# Patient Record
Sex: FEMALE | Race: WHITE | NOT HISPANIC OR LATINO
[De-identification: names, ages, dates, MRNs, and addresses within clinical notes are randomized per-mention and may not be internally consistent; named-entity substitution may affect disease eponyms.]

---

## 2018-01-29 ENCOUNTER — APPOINTMENT (OUTPATIENT)
Dept: MAMMOGRAPHY | Facility: CLINIC | Age: 48
End: 2018-01-29
Payer: COMMERCIAL

## 2018-01-29 ENCOUNTER — APPOINTMENT (OUTPATIENT)
Dept: ULTRASOUND IMAGING | Facility: CLINIC | Age: 48
End: 2018-01-29
Payer: COMMERCIAL

## 2018-01-29 ENCOUNTER — OUTPATIENT (OUTPATIENT)
Dept: OUTPATIENT SERVICES | Facility: HOSPITAL | Age: 48
LOS: 1 days | End: 2018-01-29
Payer: COMMERCIAL

## 2018-01-29 DIAGNOSIS — Z00.8 ENCOUNTER FOR OTHER GENERAL EXAMINATION: ICD-10-CM

## 2018-01-29 PROCEDURE — 77063 BREAST TOMOSYNTHESIS BI: CPT

## 2018-01-29 PROCEDURE — 77063 BREAST TOMOSYNTHESIS BI: CPT | Mod: 26

## 2018-01-29 PROCEDURE — 76641 ULTRASOUND BREAST COMPLETE: CPT | Mod: 26,50

## 2018-01-29 PROCEDURE — 77067 SCR MAMMO BI INCL CAD: CPT | Mod: 26

## 2018-01-29 PROCEDURE — 76641 ULTRASOUND BREAST COMPLETE: CPT

## 2018-01-29 PROCEDURE — 77067 SCR MAMMO BI INCL CAD: CPT

## 2018-02-26 ENCOUNTER — APPOINTMENT (OUTPATIENT)
Dept: INTERNAL MEDICINE | Facility: CLINIC | Age: 48
End: 2018-02-26

## 2018-03-08 ENCOUNTER — APPOINTMENT (OUTPATIENT)
Dept: INTERNAL MEDICINE | Facility: CLINIC | Age: 48
End: 2018-03-08
Payer: COMMERCIAL

## 2018-03-08 VITALS
HEIGHT: 61.75 IN | DIASTOLIC BLOOD PRESSURE: 78 MMHG | WEIGHT: 212 LBS | BODY MASS INDEX: 39.01 KG/M2 | OXYGEN SATURATION: 98 % | HEART RATE: 90 BPM | SYSTOLIC BLOOD PRESSURE: 130 MMHG

## 2018-03-08 PROCEDURE — 99386 PREV VISIT NEW AGE 40-64: CPT | Mod: 25

## 2018-03-08 PROCEDURE — 69210 REMOVE IMPACTED EAR WAX UNI: CPT

## 2018-03-16 LAB
25(OH)D3 SERPL-MCNC: 8.9 NG/ML
ALBUMIN SERPL ELPH-MCNC: 4.5 G/DL
ALP BLD-CCNC: 71 U/L
ALT SERPL-CCNC: 16 U/L
ANION GAP SERPL CALC-SCNC: 17 MMOL/L
AST SERPL-CCNC: 16 U/L
BASOPHILS # BLD AUTO: 0.04 K/UL
BASOPHILS NFR BLD AUTO: 0.4 %
BILIRUB SERPL-MCNC: 0.4 MG/DL
BUN SERPL-MCNC: 15 MG/DL
CALCIUM SERPL-MCNC: 9.8 MG/DL
CHLORIDE SERPL-SCNC: 103 MMOL/L
CHOLEST SERPL-MCNC: 175 MG/DL
CHOLEST/HDLC SERPL: 3.6 RATIO
CO2 SERPL-SCNC: 20 MMOL/L
CREAT SERPL-MCNC: 1 MG/DL
EOSINOPHIL # BLD AUTO: 0.15 K/UL
EOSINOPHIL NFR BLD AUTO: 1.6 %
GLUCOSE SERPL-MCNC: 84 MG/DL
HBA1C MFR BLD HPLC: 5.5 %
HCT VFR BLD CALC: 45.9 %
HDLC SERPL-MCNC: 49 MG/DL
HGB BLD-MCNC: 14.5 G/DL
IMM GRANULOCYTES NFR BLD AUTO: 0.4 %
LDLC SERPL CALC-MCNC: 99 MG/DL
LYMPHOCYTES # BLD AUTO: 2.04 K/UL
LYMPHOCYTES NFR BLD AUTO: 21.5 %
MAN DIFF?: NORMAL
MCHC RBC-ENTMCNC: 30.3 PG
MCHC RBC-ENTMCNC: 31.6 GM/DL
MCV RBC AUTO: 96 FL
MONOCYTES # BLD AUTO: 0.48 K/UL
MONOCYTES NFR BLD AUTO: 5.1 %
NEUTROPHILS # BLD AUTO: 6.73 K/UL
NEUTROPHILS NFR BLD AUTO: 71 %
PLATELET # BLD AUTO: 428 K/UL
POTASSIUM SERPL-SCNC: 4.7 MMOL/L
PROT SERPL-MCNC: 8.1 G/DL
RBC # BLD: 4.78 M/UL
RBC # FLD: 12.4 %
SODIUM SERPL-SCNC: 140 MMOL/L
TRIGL SERPL-MCNC: 134 MG/DL
WBC # FLD AUTO: 9.48 K/UL

## 2018-03-21 ENCOUNTER — FORM ENCOUNTER (OUTPATIENT)
Age: 48
End: 2018-03-21

## 2018-03-22 ENCOUNTER — OUTPATIENT (OUTPATIENT)
Dept: OUTPATIENT SERVICES | Facility: HOSPITAL | Age: 48
LOS: 1 days | End: 2018-03-22
Payer: COMMERCIAL

## 2018-03-22 ENCOUNTER — APPOINTMENT (OUTPATIENT)
Dept: ULTRASOUND IMAGING | Facility: CLINIC | Age: 48
End: 2018-03-22
Payer: COMMERCIAL

## 2018-03-22 DIAGNOSIS — Z00.8 ENCOUNTER FOR OTHER GENERAL EXAMINATION: ICD-10-CM

## 2018-03-22 PROCEDURE — 76856 US EXAM PELVIC COMPLETE: CPT

## 2018-03-22 PROCEDURE — 76830 TRANSVAGINAL US NON-OB: CPT

## 2018-03-22 PROCEDURE — 76830 TRANSVAGINAL US NON-OB: CPT | Mod: 26

## 2018-03-22 PROCEDURE — 76856 US EXAM PELVIC COMPLETE: CPT | Mod: 26

## 2019-01-28 ENCOUNTER — RESULT REVIEW (OUTPATIENT)
Age: 49
End: 2019-01-28

## 2019-01-30 ENCOUNTER — APPOINTMENT (OUTPATIENT)
Dept: ULTRASOUND IMAGING | Facility: CLINIC | Age: 49
End: 2019-01-30
Payer: COMMERCIAL

## 2019-01-30 ENCOUNTER — OUTPATIENT (OUTPATIENT)
Dept: OUTPATIENT SERVICES | Facility: HOSPITAL | Age: 49
LOS: 1 days | End: 2019-01-30
Payer: COMMERCIAL

## 2019-01-30 ENCOUNTER — APPOINTMENT (OUTPATIENT)
Dept: MAMMOGRAPHY | Facility: CLINIC | Age: 49
End: 2019-01-30
Payer: COMMERCIAL

## 2019-01-30 DIAGNOSIS — Z12.31 ENCOUNTER FOR SCREENING MAMMOGRAM FOR MALIGNANT NEOPLASM OF BREAST: ICD-10-CM

## 2019-01-30 PROCEDURE — 77067 SCR MAMMO BI INCL CAD: CPT | Mod: 26

## 2019-01-30 PROCEDURE — 77063 BREAST TOMOSYNTHESIS BI: CPT | Mod: 26

## 2019-01-30 PROCEDURE — 76641 ULTRASOUND BREAST COMPLETE: CPT | Mod: 26,50

## 2019-01-30 PROCEDURE — 77067 SCR MAMMO BI INCL CAD: CPT

## 2019-01-30 PROCEDURE — 77063 BREAST TOMOSYNTHESIS BI: CPT

## 2019-01-30 PROCEDURE — 76641 ULTRASOUND BREAST COMPLETE: CPT

## 2019-03-11 ENCOUNTER — APPOINTMENT (OUTPATIENT)
Dept: INTERNAL MEDICINE | Facility: CLINIC | Age: 49
End: 2019-03-11
Payer: COMMERCIAL

## 2019-03-11 VITALS
WEIGHT: 208 LBS | OXYGEN SATURATION: 97 % | SYSTOLIC BLOOD PRESSURE: 120 MMHG | DIASTOLIC BLOOD PRESSURE: 82 MMHG | HEART RATE: 96 BPM | BODY MASS INDEX: 38.77 KG/M2 | HEIGHT: 61.5 IN

## 2019-03-11 DIAGNOSIS — Z86.69 PERSONAL HISTORY OF OTHER DISEASES OF THE NERVOUS SYSTEM AND SENSE ORGANS: ICD-10-CM

## 2019-03-11 DIAGNOSIS — L65.9 NONSCARRING HAIR LOSS, UNSPECIFIED: ICD-10-CM

## 2019-03-11 DIAGNOSIS — N94.9 UNSPECIFIED CONDITION ASSOCIATED WITH FEMALE GENITAL ORGANS AND MENSTRUAL CYCLE: ICD-10-CM

## 2019-03-11 PROCEDURE — 99396 PREV VISIT EST AGE 40-64: CPT

## 2019-03-11 RX ORDER — ERGOCALCIFEROL 1.25 MG/1
1.25 MG CAPSULE, LIQUID FILLED ORAL
Qty: 8 | Refills: 0 | Status: DISCONTINUED | COMMUNITY
Start: 2018-03-16 | End: 2019-03-11

## 2019-03-11 NOTE — PAST MEDICAL HISTORY
[Menstruating] : hx of menstruating [Less Bleeding] : the period was lighter than normal [Irregular Cycle Intervals] : are  irregular [Total Preg ___] : G: [unfilled] [Full Term ___] : Full Term: [unfilled]

## 2019-03-29 LAB
25(OH)D3 SERPL-MCNC: 14 NG/ML
ALBUMIN SERPL ELPH-MCNC: 4.3 G/DL
ALP BLD-CCNC: 71 U/L
ALT SERPL-CCNC: 27 U/L
ANION GAP SERPL CALC-SCNC: 12 MMOL/L
AST SERPL-CCNC: 30 U/L
BASOPHILS # BLD AUTO: 0.08 K/UL
BASOPHILS NFR BLD AUTO: 0.7 %
BILIRUB SERPL-MCNC: 0.3 MG/DL
BUN SERPL-MCNC: 10 MG/DL
CALCIUM SERPL-MCNC: 9.5 MG/DL
CHLORIDE SERPL-SCNC: 102 MMOL/L
CHOLEST SERPL-MCNC: 158 MG/DL
CHOLEST/HDLC SERPL: 3.1 RATIO
CO2 SERPL-SCNC: 25 MMOL/L
CREAT SERPL-MCNC: 0.87 MG/DL
CRP SERPL-MCNC: 1.7 MG/DL
EOSINOPHIL # BLD AUTO: 0.18 K/UL
EOSINOPHIL NFR BLD AUTO: 1.7 %
ERYTHROCYTE [SEDIMENTATION RATE] IN BLOOD BY WESTERGREN METHOD: 9 MM/HR
FERRITIN SERPL-MCNC: 190 NG/ML
GLUCOSE SERPL-MCNC: 84 MG/DL
HCT VFR BLD CALC: 44.8 %
HDLC SERPL-MCNC: 51 MG/DL
HGB BLD-MCNC: 14.1 G/DL
IMM GRANULOCYTES NFR BLD AUTO: 0.5 %
IRON SATN MFR SERPL: 23 %
IRON SERPL-MCNC: 72 UG/DL
JAK2 GENE MUT ANL BLD/T: NORMAL
LDLC SERPL CALC-MCNC: 86 MG/DL
LYMPHOCYTES # BLD AUTO: 2.48 K/UL
LYMPHOCYTES NFR BLD AUTO: 22.9 %
MAN DIFF?: NORMAL
MCHC RBC-ENTMCNC: 30.4 PG
MCHC RBC-ENTMCNC: 31.5 GM/DL
MCV RBC AUTO: 96.6 FL
MONOCYTES # BLD AUTO: 0.68 K/UL
MONOCYTES NFR BLD AUTO: 6.3 %
NEUTROPHILS # BLD AUTO: 7.37 K/UL
NEUTROPHILS NFR BLD AUTO: 67.9 %
PLATELET # BLD AUTO: 417 K/UL
POTASSIUM SERPL-SCNC: 4.4 MMOL/L
PROT SERPL-MCNC: 7.7 G/DL
RBC # BLD: 4.64 M/UL
RBC # FLD: 12.1 %
SODIUM SERPL-SCNC: 139 MMOL/L
TIBC SERPL-MCNC: 307 UG/DL
TRIGL SERPL-MCNC: 103 MG/DL
TSH SERPL-ACNC: 2.28 UIU/ML
UIBC SERPL-MCNC: 235 UG/DL
WBC # FLD AUTO: 10.84 K/UL

## 2019-03-29 NOTE — ASSESSMENT
[FreeTextEntry1] : 49 yo female with h/o as above including rosacea, thrombocytosis of ?etiology, obesity, here for CPE.\par 1.  Heme - thrombocytosis on few labs, check CBC and check iron studies, ESR/CRP, rosetta 2\par 2.  Endo - hair loss, check iron above and TSH; check vitamin D, discussed weight loss options for pt, suggested low carb diet and c/w exercise, check lipids\par 3.  Gyn - pap and mammo utd, will get records\par 4.  HCM - check below labs; vaccines utd\par 5.  RTO prn or 1 year

## 2019-03-29 NOTE — REVIEW OF SYSTEMS
[Constipation] : constipation [Heartburn] : heartburn [Negative] : Musculoskeletal [Fever] : no fever [Chills] : no chills [Fatigue] : no fatigue [Recent Change In Weight] : ~T no recent weight change [Chest Pain] : no chest pain [Palpitations] : no palpitations [Shortness Of Breath] : no shortness of breath [Cough] : no cough [Abdominal Pain] : no abdominal pain [Nausea] : no nausea [Diarrhea] : diarrhea [Vomiting] : no vomiting [Melena] : no melena [Dysuria] : no dysuria [Vaginal Discharge] : no vaginal discharge [Skin Rash] : no skin rash [Dizziness] : no dizziness [Fainting] : no fainting [Anxiety] : no anxiety [Depression] : no depression [Easy Bleeding] : no easy bleeding [Easy Bruising] : no easy bruising [Swollen Glands] : no swollen glands [FreeTextEntry2] : going to the gym, getting walking into daily routine [FreeTextEntry7] : constipation on occasion, GERD on occasion but improved [FreeTextEntry9] : knees hurt but due to weight

## 2019-03-29 NOTE — HISTORY OF PRESENT ILLNESS
[FreeTextEntry1] : physical [de-identified] : 49 yo female with h/o as below here for CPE.\par Feeling well, no complaints.  Noting some hair loss, trying biotin but not sure if helping.

## 2019-03-29 NOTE — HEALTH RISK ASSESSMENT
[Patient reported mammogram was normal] : Patient reported mammogram was normal [Patient reported PAP Smear was normal] : Patient reported PAP Smear was normal [0] : 2) Feeling down, depressed, or hopeless: Not at all (0) [MammogramDate] : 01/19 [PapSmearDate] : 01/19 [ColonoscopyComments] : hasn't had

## 2019-03-29 NOTE — ADDENDUM
[FreeTextEntry1] : 3/22/19:  LM to call back re results.\par 3/29/19:  Reviewed labs with pt, nl except WBC 10.8 slightly elevated, platelets 417 slightly elevated with elevated CRP and ferritin (?nonspecific inflammation, mild, consider repeating in 6 months), vitamin D 14 c/w deficiency, to take vitamin D 2000 Iu/day, other labs nl.

## 2019-03-29 NOTE — PHYSICAL EXAM
[No Acute Distress] : no acute distress [Well Nourished] : well nourished [Well Developed] : well developed [Well-Appearing] : well-appearing [PERRL] : pupils equal round and reactive to light [Normal Oropharynx] : the oropharynx was normal [Normal TMs] : both tympanic membranes were normal [Normal Nasal Mucosa] : the nasal mucosa was normal [Supple] : supple [No Lymphadenopathy] : no lymphadenopathy [Thyroid Normal, No Nodules] : the thyroid was normal and there were no nodules present [No Respiratory Distress] : no respiratory distress  [Clear to Auscultation] : lungs were clear to auscultation bilaterally [No Accessory Muscle Use] : no accessory muscle use [Normal Rate] : normal rate  [Regular Rhythm] : with a regular rhythm [Normal S1, S2] : normal S1 and S2 [No Murmur] : no murmur heard [No Carotid Bruits] : no carotid bruits [Pedal Pulses Present] : the pedal pulses are present [Normal Appearance] : normal in appearance [No Nipple Discharge] : no nipple discharge [No Axillary Lymphadenopathy] : no axillary lymphadenopathy [Soft] : abdomen soft [Non Tender] : non-tender [Non-distended] : non-distended [No Masses] : no abdominal mass palpated [No HSM] : no HSM [Normal Bowel Sounds] : normal bowel sounds [Normal Supraclavicular Nodes] : no supraclavicular lymphadenopathy [Normal Axillary Nodes] : no axillary lymphadenopathy [Normal Posterior Cervical Nodes] : no posterior cervical lymphadenopathy [Normal Anterior Cervical Nodes] : no anterior cervical lymphadenopathy [Normal Inguinal Nodes] : no inguinal lymphadenopathy [No Joint Swelling] : no joint swelling [No Rash] : no rash [Normal Gait] : normal gait [Normal Affect] : the affect was normal [de-identified] : scant cerumen right auditory canal [de-identified] : trace pitting edema b/l

## 2020-02-11 ENCOUNTER — RESULT REVIEW (OUTPATIENT)
Age: 50
End: 2020-02-11

## 2020-03-06 ENCOUNTER — APPOINTMENT (OUTPATIENT)
Dept: ULTRASOUND IMAGING | Facility: CLINIC | Age: 50
End: 2020-03-06
Payer: COMMERCIAL

## 2020-03-06 ENCOUNTER — OUTPATIENT (OUTPATIENT)
Dept: OUTPATIENT SERVICES | Facility: HOSPITAL | Age: 50
LOS: 1 days | End: 2020-03-06
Payer: COMMERCIAL

## 2020-03-06 ENCOUNTER — APPOINTMENT (OUTPATIENT)
Dept: MAMMOGRAPHY | Facility: CLINIC | Age: 50
End: 2020-03-06
Payer: COMMERCIAL

## 2020-03-06 DIAGNOSIS — Z00.8 ENCOUNTER FOR OTHER GENERAL EXAMINATION: ICD-10-CM

## 2020-03-06 PROCEDURE — 76641 ULTRASOUND BREAST COMPLETE: CPT

## 2020-03-06 PROCEDURE — 77063 BREAST TOMOSYNTHESIS BI: CPT | Mod: 26

## 2020-03-06 PROCEDURE — 77063 BREAST TOMOSYNTHESIS BI: CPT

## 2020-03-06 PROCEDURE — 77067 SCR MAMMO BI INCL CAD: CPT | Mod: 26

## 2020-03-06 PROCEDURE — 76641 ULTRASOUND BREAST COMPLETE: CPT | Mod: 26,50

## 2020-03-06 PROCEDURE — 77067 SCR MAMMO BI INCL CAD: CPT

## 2020-04-25 ENCOUNTER — MESSAGE (OUTPATIENT)
Age: 50
End: 2020-04-25

## 2020-05-06 ENCOUNTER — APPOINTMENT (OUTPATIENT)
Dept: DISASTER EMERGENCY | Facility: CLINIC | Age: 50
End: 2020-05-06

## 2020-05-07 LAB
SARS-COV-2 IGG SERPL IA-ACNC: 0 INDEX
SARS-COV-2 IGG SERPL QL IA: NEGATIVE

## 2020-06-01 ENCOUNTER — APPOINTMENT (OUTPATIENT)
Dept: INTERNAL MEDICINE | Facility: CLINIC | Age: 50
End: 2020-06-01

## 2020-06-08 ENCOUNTER — APPOINTMENT (OUTPATIENT)
Dept: INTERNAL MEDICINE | Facility: CLINIC | Age: 50
End: 2020-06-08
Payer: COMMERCIAL

## 2020-06-08 ENCOUNTER — NON-APPOINTMENT (OUTPATIENT)
Age: 50
End: 2020-06-08

## 2020-06-08 VITALS
WEIGHT: 212 LBS | HEIGHT: 61.5 IN | BODY MASS INDEX: 39.51 KG/M2 | OXYGEN SATURATION: 97 % | DIASTOLIC BLOOD PRESSURE: 80 MMHG | SYSTOLIC BLOOD PRESSURE: 130 MMHG | HEART RATE: 93 BPM

## 2020-06-08 PROCEDURE — 93000 ELECTROCARDIOGRAM COMPLETE: CPT

## 2020-06-08 PROCEDURE — 99396 PREV VISIT EST AGE 40-64: CPT | Mod: 25

## 2020-06-08 RX ORDER — AZELAIC ACID 0.15 G/G
15 GEL TOPICAL
Refills: 0 | Status: DISCONTINUED | COMMUNITY
End: 2020-06-08

## 2020-06-08 NOTE — PHYSICAL EXAM
[No Acute Distress] : no acute distress [Well Nourished] : well nourished [Well Developed] : well developed [Well-Appearing] : well-appearing [PERRL] : pupils equal round and reactive to light [Normal Oropharynx] : the oropharynx was normal [Normal TMs] : both tympanic membranes were normal [No Lymphadenopathy] : no lymphadenopathy [Supple] : supple [Thyroid Normal, No Nodules] : the thyroid was normal and there were no nodules present [No Respiratory Distress] : no respiratory distress  [No Accessory Muscle Use] : no accessory muscle use [Clear to Auscultation] : lungs were clear to auscultation bilaterally [Normal Rate] : normal rate  [Regular Rhythm] : with a regular rhythm [Normal S1, S2] : normal S1 and S2 [No Murmur] : no murmur heard [No Carotid Bruits] : no carotid bruits [Pedal Pulses Present] : the pedal pulses are present [No Edema] : there was no peripheral edema [Declined Breast Exam] : declined breast exam  [Soft] : abdomen soft [Non Tender] : non-tender [Non-distended] : non-distended [No Masses] : no abdominal mass palpated [No HSM] : no HSM [Normal Bowel Sounds] : normal bowel sounds [Normal Supraclavicular Nodes] : no supraclavicular lymphadenopathy [Normal Posterior Cervical Nodes] : no posterior cervical lymphadenopathy [Normal Anterior Cervical Nodes] : no anterior cervical lymphadenopathy [Normal Inguinal Nodes] : no inguinal lymphadenopathy [No Joint Swelling] : no joint swelling [No Rash] : no rash [Normal Gait] : normal gait [Normal Affect] : the affect was normal

## 2020-06-09 ENCOUNTER — TRANSCRIPTION ENCOUNTER (OUTPATIENT)
Age: 50
End: 2020-06-09

## 2020-06-10 ENCOUNTER — APPOINTMENT (OUTPATIENT)
Dept: CARDIOLOGY | Facility: CLINIC | Age: 50
End: 2020-06-10
Payer: COMMERCIAL

## 2020-06-10 ENCOUNTER — NON-APPOINTMENT (OUTPATIENT)
Age: 50
End: 2020-06-10

## 2020-06-10 VITALS — DIASTOLIC BLOOD PRESSURE: 90 MMHG | SYSTOLIC BLOOD PRESSURE: 142 MMHG

## 2020-06-10 VITALS
OXYGEN SATURATION: 97 % | HEART RATE: 87 BPM | HEIGHT: 61.5 IN | SYSTOLIC BLOOD PRESSURE: 151 MMHG | BODY MASS INDEX: 40.07 KG/M2 | DIASTOLIC BLOOD PRESSURE: 90 MMHG | WEIGHT: 215 LBS

## 2020-06-10 DIAGNOSIS — Z78.9 OTHER SPECIFIED HEALTH STATUS: ICD-10-CM

## 2020-06-10 PROCEDURE — 93000 ELECTROCARDIOGRAM COMPLETE: CPT

## 2020-06-10 PROCEDURE — 99204 OFFICE O/P NEW MOD 45 MIN: CPT

## 2020-06-10 RX ORDER — IVERMECTIN 10 MG/G
1 CREAM TOPICAL
Refills: 0 | Status: DISCONTINUED | COMMUNITY
End: 2020-06-10

## 2020-06-10 RX ORDER — IVERMECTIN 10 MG/G
1 CREAM TOPICAL
Qty: 1 | Refills: 3 | Status: DISCONTINUED | COMMUNITY
Start: 2020-06-08 | End: 2020-06-10

## 2020-06-10 NOTE — PHYSICAL EXAM
[Well Groomed] : well groomed [General Appearance - In No Acute Distress] : no acute distress [Normal Conjunctiva] : the conjunctiva exhibited no abnormalities [Eyelids - No Xanthelasma] : the eyelids demonstrated no xanthelasmas [Normal Oral Mucosa] : normal oral mucosa [No Oral Pallor] : no oral pallor [No Oral Cyanosis] : no oral cyanosis [Normal Jugular Venous A Waves Present] : normal jugular venous A waves present [Normal Jugular Venous V Waves Present] : normal jugular venous V waves present [No Jugular Venous Burris A Waves] : no jugular venous burris A waves [Normal Rate] : normal [Rhythm Regular] : regular [Normal S1] : normal S1 [Normal S2] : normal S2 [No Gallop] : no gallop heard [No Murmur] : no murmurs heard [2+] : left 2+ [No Pitting Edema] : no pitting edema present [Respiration, Rhythm And Depth] : normal respiratory rhythm and effort [Exaggerated Use Of Accessory Muscles For Inspiration] : no accessory muscle use [Auscultation Breath Sounds / Voice Sounds] : lungs were clear to auscultation bilaterally [Abdomen Soft] : soft [Abdomen Tenderness] : non-tender [Abdomen Mass (___ Cm)] : no abdominal mass palpated [Abnormal Walk] : normal gait [Gait - Sufficient For Exercise Testing] : the gait was sufficient for exercise testing [Nail Clubbing] : no clubbing of the fingernails [Cyanosis, Localized] : no localized cyanosis [Petechial Hemorrhages (___cm)] : no petechial hemorrhages [Skin Color & Pigmentation] : normal skin color and pigmentation [] : no rash [No Venous Stasis] : no venous stasis [Skin Lesions] : no skin lesions [No Skin Ulcers] : no skin ulcer [No Xanthoma] : no  xanthoma was observed [Oriented To Time, Place, And Person] : oriented to person, place, and time [Affect] : the affect was normal [Mood] : the mood was normal [No Anxiety] : not feeling anxious [Right Carotid Bruit] : no bruit heard over the right carotid [Left Carotid Bruit] : no bruit heard over the left carotid [Bruit] : no bruit heard [Rt] : no varicose veins of the right leg [Lt] : no varicose veins of the left leg

## 2020-06-10 NOTE — HISTORY OF PRESENT ILLNESS
[FreeTextEntry1] : 49 year old woman with elevated BP in past presents for initial cardiac evaluation. \par \par She has been feeling midsternal chest pressure that started about 6 months ago, now increasing in frequency to becoming daily, nonexertional, generally nonexertional, occurring more at work, can last up to hours an episode. The pain is self limiting. \par She complains mild dyspnea on exertion when going up the stairs. \par She   denies any PND, orthopnea, lower extremity edema, near syncope, syncope, strokelike symptoms. \par She has been more sedentary recently. She states that she randomly will feel palpitations for an hours at a times every few weeks lasting about an hour. \par She has not been eating much recently and drink lots of coffee.

## 2020-06-15 ENCOUNTER — APPOINTMENT (OUTPATIENT)
Dept: CARDIOLOGY | Facility: CLINIC | Age: 50
End: 2020-06-15
Payer: COMMERCIAL

## 2020-06-15 PROCEDURE — 93015 CV STRESS TEST SUPVJ I&R: CPT

## 2020-06-19 ENCOUNTER — APPOINTMENT (OUTPATIENT)
Dept: CARDIOLOGY | Facility: CLINIC | Age: 50
End: 2020-06-19
Payer: COMMERCIAL

## 2020-06-19 PROCEDURE — 93306 TTE W/DOPPLER COMPLETE: CPT

## 2020-06-22 ENCOUNTER — TRANSCRIPTION ENCOUNTER (OUTPATIENT)
Age: 50
End: 2020-06-22

## 2020-06-24 ENCOUNTER — TRANSCRIPTION ENCOUNTER (OUTPATIENT)
Age: 50
End: 2020-06-24

## 2020-07-22 ENCOUNTER — TRANSCRIPTION ENCOUNTER (OUTPATIENT)
Age: 50
End: 2020-07-22

## 2020-07-24 LAB
25(OH)D3 SERPL-MCNC: 15.5 NG/ML
ALBUMIN SERPL ELPH-MCNC: 4.6 G/DL
ALP BLD-CCNC: 74 U/L
ALT SERPL-CCNC: 14 U/L
ANION GAP SERPL CALC-SCNC: 14 MMOL/L
AST SERPL-CCNC: 17 U/L
BASOPHILS # BLD AUTO: 0.07 K/UL
BASOPHILS NFR BLD AUTO: 0.7 %
BILIRUB SERPL-MCNC: 0.2 MG/DL
BUN SERPL-MCNC: 11 MG/DL
CALCIUM SERPL-MCNC: 9.3 MG/DL
CHLORIDE SERPL-SCNC: 102 MMOL/L
CO2 SERPL-SCNC: 21 MMOL/L
CREAT SERPL-MCNC: 0.97 MG/DL
CRP SERPL-MCNC: 0.92 MG/DL
EOSINOPHIL # BLD AUTO: 0.14 K/UL
EOSINOPHIL NFR BLD AUTO: 1.3 %
ERYTHROCYTE [SEDIMENTATION RATE] IN BLOOD BY WESTERGREN METHOD: 38 MM/HR
GLUCOSE SERPL-MCNC: 92 MG/DL
HCT VFR BLD CALC: 45.5 %
HGB BLD-MCNC: 14.7 G/DL
IMM GRANULOCYTES NFR BLD AUTO: 0.5 %
LYMPHOCYTES # BLD AUTO: 2.75 K/UL
LYMPHOCYTES NFR BLD AUTO: 25.6 %
MAN DIFF?: NORMAL
MCHC RBC-ENTMCNC: 30.4 PG
MCHC RBC-ENTMCNC: 32.3 GM/DL
MCV RBC AUTO: 94 FL
MONOCYTES # BLD AUTO: 0.74 K/UL
MONOCYTES NFR BLD AUTO: 6.9 %
NEUTROPHILS # BLD AUTO: 6.98 K/UL
NEUTROPHILS NFR BLD AUTO: 65 %
PLATELET # BLD AUTO: 399 K/UL
POTASSIUM SERPL-SCNC: 4.1 MMOL/L
PROT SERPL-MCNC: 7.7 G/DL
RBC # BLD: 4.84 M/UL
RBC # FLD: 11.9 %
SODIUM SERPL-SCNC: 137 MMOL/L
WBC # FLD AUTO: 10.73 K/UL

## 2020-07-24 NOTE — ASSESSMENT
[FreeTextEntry1] : 48 yo female with h/o as above including rosacea, obesity, mild thrombocytosis previously here for CPE.\par 1.  CV - atypical chest pain, suspect may be stress related as not triggered by exertion but does have ?qwaves in III/aVF and nonspecific T wave inversions/abnl in V2-V3, so will send to cardiology Dr. Landaverde/April for eval (may need stress/echo); recent lipids at goal, bp at goal\par 2.  Gyn - pap and mammo utd, will get report\par 3.  GI - not yet due for colonoscopy \par 4.  Heme - elevated WBC and platelets previously, recheck with inflammatory markers again\par 5.  HCM - check below labs; vaccines utd\par 6.  RTO prn or 1 year

## 2020-07-24 NOTE — HEALTH RISK ASSESSMENT
[Patient reported mammogram was normal] : Patient reported mammogram was normal [0] : 2) Feeling down, depressed, or hopeless: Not at all (0) [PapSmearDate] : 01/20 [MammogramDate] : 01/20 [ColonoscopyComments] : not yet

## 2020-07-24 NOTE — ADDENDUM
[FreeTextEntry1] : 7/24/20:  Reviewed labs with pt, nl except slightly elevated WBC 10.73 improved from prior but still elevated with mildly elevated ESR and CRP, notes has some joint pain, fatigue, had some hair loss (per history), had nl tsh before, will refer to rheum to r/o autoimmune disease, vitamin D 15 to take supplements, other labs nl.

## 2020-07-24 NOTE — REVIEW OF SYSTEMS
[Fatigue] : fatigue [Chest Pain] : chest pain [Heartburn] : heartburn [Negative] : Musculoskeletal [Fever] : no fever [Recent Change In Weight] : ~T no recent weight change [Chills] : no chills [Palpitations] : no palpitations [Dyspnea on Exertion] : no dyspnea on exertion [Shortness Of Breath] : no shortness of breath [Cough] : no cough [Abdominal Pain] : no abdominal pain [Nausea] : no nausea [Constipation] : no constipation [Diarrhea] : diarrhea [Melena] : no melena [Dysuria] : no dysuria [Vomiting] : no vomiting [Skin Rash] : no skin rash [Vaginal Discharge] : no vaginal discharge [Headache] : no headache [Dizziness] : no dizziness [Anxiety] : no anxiety [Fainting] : no fainting [Depression] : no depression [Swollen Glands] : no swollen glands [Easy Bleeding] : no easy bleeding [Easy Bruising] : no easy bruising [FreeTextEntry7] : occasional diarrhea and constipation; gerd on occasion [FreeTextEntry3] : saw optho, has fuchs dystrophy [de-identified] : occasional HA

## 2020-07-24 NOTE — HISTORY OF PRESENT ILLNESS
[FreeTextEntry1] : physical [de-identified] : 48 yo female with h/o as below here for CPE.  \par Having intermittent chest pain, no palpitations, no shortness of breath.  Not sure if it's stress-related.  Will tend to occur when stressed/overwhelmed.  Will get SOB only from the mask but no chest pressure with exertion, can go up a flight of stairs without chest pressure.  Had been walking but not much as getting home very tired.  Generally sleeping better than she was initially but on and off.  Doesn't believe needs to see a therapist, does do breathing for stress relief, venting to  helps, and crying and that will help.  \par No other active issues.  Sees dermatologist for rosacea, ran out of topical cream, asking for refill, now on soolantra.

## 2020-10-15 ENCOUNTER — APPOINTMENT (OUTPATIENT)
Dept: CARDIOLOGY | Facility: CLINIC | Age: 50
End: 2020-10-15
Payer: COMMERCIAL

## 2020-10-15 ENCOUNTER — NON-APPOINTMENT (OUTPATIENT)
Age: 50
End: 2020-10-15

## 2020-10-15 VITALS
DIASTOLIC BLOOD PRESSURE: 87 MMHG | HEART RATE: 72 BPM | BODY MASS INDEX: 38.02 KG/M2 | OXYGEN SATURATION: 97 % | HEIGHT: 61.5 IN | WEIGHT: 204 LBS | SYSTOLIC BLOOD PRESSURE: 136 MMHG

## 2020-10-15 PROCEDURE — 99214 OFFICE O/P EST MOD 30 MIN: CPT

## 2020-10-15 PROCEDURE — 93000 ELECTROCARDIOGRAM COMPLETE: CPT

## 2020-10-15 NOTE — PHYSICAL EXAM
[General Appearance - In No Acute Distress] : no acute distress [Well Groomed] : well groomed [Eyelids - No Xanthelasma] : the eyelids demonstrated no xanthelasmas [Normal Oral Mucosa] : normal oral mucosa [Normal Conjunctiva] : the conjunctiva exhibited no abnormalities [No Oral Pallor] : no oral pallor [No Oral Cyanosis] : no oral cyanosis [No Jugular Venous Burris A Waves] : no jugular venous burris A waves [Normal Jugular Venous A Waves Present] : normal jugular venous A waves present [Normal Jugular Venous V Waves Present] : normal jugular venous V waves present [Exaggerated Use Of Accessory Muscles For Inspiration] : no accessory muscle use [Respiration, Rhythm And Depth] : normal respiratory rhythm and effort [Abdomen Tenderness] : non-tender [Auscultation Breath Sounds / Voice Sounds] : lungs were clear to auscultation bilaterally [Abdomen Soft] : soft [Abdomen Mass (___ Cm)] : no abdominal mass palpated [Abnormal Walk] : normal gait [Gait - Sufficient For Exercise Testing] : the gait was sufficient for exercise testing [Nail Clubbing] : no clubbing of the fingernails [Petechial Hemorrhages (___cm)] : no petechial hemorrhages [Cyanosis, Localized] : no localized cyanosis [Skin Color & Pigmentation] : normal skin color and pigmentation [] : no rash [No Venous Stasis] : no venous stasis [Skin Lesions] : no skin lesions [No Skin Ulcers] : no skin ulcer [Affect] : the affect was normal [Oriented To Time, Place, And Person] : oriented to person, place, and time [No Xanthoma] : no  xanthoma was observed [Normal Rate] : normal [Mood] : the mood was normal [No Anxiety] : not feeling anxious [Normal S1] : normal S1 [Rhythm Regular] : regular [No Gallop] : no gallop heard [No Murmur] : no murmurs heard [Normal S2] : normal S2 [2+] : left 2+ [Bruit] : no bruit heard [Right Carotid Bruit] : no bruit heard over the right carotid [Left Carotid Bruit] : no bruit heard over the left carotid [Rt] : no varicose veins of the right leg [No Pitting Edema] : no pitting edema present [Lt] : no varicose veins of the left leg

## 2020-10-15 NOTE — DISCUSSION/SUMMARY
[FreeTextEntry1] : 49 year woman with a history as listed presents for a followup cardiac evaluation. \par Raquel is doing well. She denies any anginal symptoms. Clinically she is euvolemic on exam. \par Her last set of lipids are controlled.  Her EKG did not reveal any significant ischemic changes. \par She had an exercise stress test unrevealing for ischemia on 6/2020 with poor exercise tolerance. She had an echo in 6/2020 that showed normal systolic LV function without any significant other findings, including no significant valvular disease. \par Her blood pressure has improved with lifestyle modifications. \par Exercise and diet counseling was performed in order to reduce her future cardiovascular risk.\par She will followup with me in 12 months or sooner if necessary.

## 2020-10-15 NOTE — HISTORY OF PRESENT ILLNESS
[FreeTextEntry1] : 49 year old woman with elevated BP in past presents for followup cardiac evaluation. \par She had an exercise stress test unrevealing for ischemia on 6/2020 with poor exercise tolerance. She had an echo in 6/2020 that showed normal systolic LV function without any significant other findings, including no significant valvular disease. \par \par Her chest pain improved with PPI, Maalox PRN and dietary changes. She is walking and biking on the weekend. She complains mild dyspnea on exertion when going up the stairs. She has lost 11 lbs. \par She   denies any palpitations, PND, orthopnea, lower extremity edema, near syncope, syncope, strokelike symptoms. \par

## 2021-02-16 ENCOUNTER — RESULT REVIEW (OUTPATIENT)
Age: 51
End: 2021-02-16

## 2021-03-18 ENCOUNTER — RX RENEWAL (OUTPATIENT)
Age: 51
End: 2021-03-18

## 2021-03-19 ENCOUNTER — RX RENEWAL (OUTPATIENT)
Age: 51
End: 2021-03-19

## 2021-04-03 ENCOUNTER — RESULT REVIEW (OUTPATIENT)
Age: 51
End: 2021-04-03

## 2021-04-03 ENCOUNTER — APPOINTMENT (OUTPATIENT)
Dept: ULTRASOUND IMAGING | Facility: CLINIC | Age: 51
End: 2021-04-03
Payer: COMMERCIAL

## 2021-04-03 ENCOUNTER — OUTPATIENT (OUTPATIENT)
Dept: OUTPATIENT SERVICES | Facility: HOSPITAL | Age: 51
LOS: 1 days | End: 2021-04-03
Payer: COMMERCIAL

## 2021-04-03 ENCOUNTER — APPOINTMENT (OUTPATIENT)
Dept: MAMMOGRAPHY | Facility: CLINIC | Age: 51
End: 2021-04-03
Payer: COMMERCIAL

## 2021-04-03 DIAGNOSIS — Z00.8 ENCOUNTER FOR OTHER GENERAL EXAMINATION: ICD-10-CM

## 2021-04-03 PROCEDURE — 77067 SCR MAMMO BI INCL CAD: CPT | Mod: 26

## 2021-04-03 PROCEDURE — 76641 ULTRASOUND BREAST COMPLETE: CPT | Mod: 26,50

## 2021-04-03 PROCEDURE — 77063 BREAST TOMOSYNTHESIS BI: CPT | Mod: 26

## 2021-04-03 PROCEDURE — 77067 SCR MAMMO BI INCL CAD: CPT

## 2021-04-03 PROCEDURE — 77063 BREAST TOMOSYNTHESIS BI: CPT

## 2021-04-03 PROCEDURE — 76641 ULTRASOUND BREAST COMPLETE: CPT

## 2022-01-02 LAB — SARS-COV-2 N GENE NPH QL NAA+PROBE: NOT DETECTED

## 2022-02-02 ENCOUNTER — APPOINTMENT (OUTPATIENT)
Dept: INTERNAL MEDICINE | Facility: CLINIC | Age: 52
End: 2022-02-02
Payer: COMMERCIAL

## 2022-02-02 VITALS
HEIGHT: 61.5 IN | HEART RATE: 91 BPM | WEIGHT: 215 LBS | BODY MASS INDEX: 40.07 KG/M2 | OXYGEN SATURATION: 98 % | DIASTOLIC BLOOD PRESSURE: 88 MMHG | SYSTOLIC BLOOD PRESSURE: 130 MMHG

## 2022-02-02 DIAGNOSIS — D75.839 THROMBOCYTOSIS, UNSPECIFIED: ICD-10-CM

## 2022-02-02 DIAGNOSIS — R07.89 OTHER CHEST PAIN: ICD-10-CM

## 2022-02-02 PROCEDURE — 99396 PREV VISIT EST AGE 40-64: CPT

## 2022-02-02 RX ORDER — CHOLECALCIFEROL (VITAMIN D3) 25 MCG
TABLET ORAL
Refills: 0 | Status: DISCONTINUED | COMMUNITY
End: 2022-02-02

## 2022-02-02 RX ORDER — BIOTIN 10 MG
TABLET ORAL
Refills: 0 | Status: DISCONTINUED | COMMUNITY
End: 2022-02-02

## 2022-02-02 RX ORDER — MULTIVITAMIN
TABLET ORAL
Refills: 0 | Status: DISCONTINUED | COMMUNITY
End: 2022-02-02

## 2022-02-21 LAB
25(OH)D3 SERPL-MCNC: 12.6 NG/ML
ALBUMIN SERPL ELPH-MCNC: 4.8 G/DL
ALP BLD-CCNC: 70 U/L
ALT SERPL-CCNC: 17 U/L
ANION GAP SERPL CALC-SCNC: 15 MMOL/L
AST SERPL-CCNC: 16 U/L
BASOPHILS # BLD AUTO: 0.08 K/UL
BASOPHILS NFR BLD AUTO: 0.7 %
BILIRUB SERPL-MCNC: 0.2 MG/DL
BUN SERPL-MCNC: 13 MG/DL
CALCIUM SERPL-MCNC: 9.8 MG/DL
CHLORIDE SERPL-SCNC: 101 MMOL/L
CHOLEST SERPL-MCNC: 177 MG/DL
CO2 SERPL-SCNC: 23 MMOL/L
CREAT SERPL-MCNC: 0.88 MG/DL
CRP SERPL-MCNC: 13 MG/L
EOSINOPHIL # BLD AUTO: 0.13 K/UL
EOSINOPHIL NFR BLD AUTO: 1.2 %
ERYTHROCYTE [SEDIMENTATION RATE] IN BLOOD BY WESTERGREN METHOD: 13 MM/HR
ESTIMATED AVERAGE GLUCOSE: 108 MG/DL
GLUCOSE SERPL-MCNC: 84 MG/DL
HBA1C MFR BLD HPLC: 5.4 %
HCT VFR BLD CALC: 48.3 %
HDLC SERPL-MCNC: 52 MG/DL
HGB BLD-MCNC: 15.2 G/DL
IMM GRANULOCYTES NFR BLD AUTO: 1.1 %
LDLC SERPL CALC-MCNC: 109 MG/DL
LYMPHOCYTES # BLD AUTO: 2.13 K/UL
LYMPHOCYTES NFR BLD AUTO: 19.7 %
MAN DIFF?: NORMAL
MCHC RBC-ENTMCNC: 30.8 PG
MCHC RBC-ENTMCNC: 31.5 GM/DL
MCV RBC AUTO: 98 FL
MONOCYTES # BLD AUTO: 0.64 K/UL
MONOCYTES NFR BLD AUTO: 5.9 %
NEUTROPHILS # BLD AUTO: 7.69 K/UL
NEUTROPHILS NFR BLD AUTO: 71.4 %
NONHDLC SERPL-MCNC: 125 MG/DL
PLATELET # BLD AUTO: 434 K/UL
POTASSIUM SERPL-SCNC: 4.3 MMOL/L
PROT SERPL-MCNC: 8.1 G/DL
RBC # BLD: 4.93 M/UL
RBC # FLD: 12.1 %
SODIUM SERPL-SCNC: 139 MMOL/L
TRIGL SERPL-MCNC: 82 MG/DL
TSH SERPL-ACNC: 1.69 UIU/ML
WBC # FLD AUTO: 10.79 K/UL

## 2022-02-21 NOTE — ASSESSMENT
[FreeTextEntry1] : 52 yo female with h/o as above including obesity and rosacea here for CPE.\par 1.  Endo - reinforced diet/exercise/weight loss, will commit to exercising 2 days/week for 30 minutes/day, referred to weight management; check A1c, lipids, TSH for weight gain, vitamin D for deficiency previously\par 2.  GI - due for colonoscopy, will go\par 3.  Gyn - pap and mammo utd, will f/up with gyn this year for repeat\par 4.  HCM - check other below labs including ESR/CRP as elevated previously (?from obesity), vaccines utd\par 5.  RTO prn or 1 year

## 2022-02-21 NOTE — HEALTH RISK ASSESSMENT
[Patient reported mammogram was normal] : Patient reported mammogram was normal [Patient reported PAP Smear was normal] : Patient reported PAP Smear was normal [MammogramDate] : 04/21 [PapSmearDate] : 04/21 [ColonoscopyComments] : not yet, will go

## 2022-02-21 NOTE — ADDENDUM
[FreeTextEntry1] : 2/18/22:  Reviewed labs with pt, nl except WBC 10.79 slightly elevated, platelets 430 also slightly elevated, nl ESR but elevated CRP 13, denies any symptoms suggestive of inflammatory disorder, ?related to obesity, will monitor with future labs,  acceptable, vitamin D 12 c/w insufficiency to take supplement, other labs nl.

## 2022-02-21 NOTE — PHYSICAL EXAM
[No Acute Distress] : no acute distress [Well Nourished] : well nourished [Well Developed] : well developed [Well-Appearing] : well-appearing [PERRL] : pupils equal round and reactive to light [EOMI] : extraocular movements intact [No Lymphadenopathy] : no lymphadenopathy [Supple] : supple [Thyroid Normal, No Nodules] : the thyroid was normal and there were no nodules present [No Respiratory Distress] : no respiratory distress  [No Accessory Muscle Use] : no accessory muscle use [Clear to Auscultation] : lungs were clear to auscultation bilaterally [Normal Rate] : normal rate  [Regular Rhythm] : with a regular rhythm [Normal S1, S2] : normal S1 and S2 [No Murmur] : no murmur heard [No Carotid Bruits] : no carotid bruits [Pedal Pulses Present] : the pedal pulses are present [No Edema] : there was no peripheral edema [Normal Appearance] : normal in appearance [No Nipple Discharge] : no nipple discharge [No Axillary Lymphadenopathy] : no axillary lymphadenopathy [Soft] : abdomen soft [Non Tender] : non-tender [Non-distended] : non-distended [No Masses] : no abdominal mass palpated [No HSM] : no HSM [Normal Bowel Sounds] : normal bowel sounds [Normal Supraclavicular Nodes] : no supraclavicular lymphadenopathy [Normal Axillary Nodes] : no axillary lymphadenopathy [Normal Posterior Cervical Nodes] : no posterior cervical lymphadenopathy [Normal Anterior Cervical Nodes] : no anterior cervical lymphadenopathy [Normal Inguinal Nodes] : no inguinal lymphadenopathy [No Joint Swelling] : no joint swelling [No Rash] : no rash [Normal Gait] : normal gait [Normal Affect] : the affect was normal [de-identified] : cerumen right ear canal, nl left ear canal

## 2022-02-21 NOTE — REVIEW OF SYSTEMS
[Fatigue] : fatigue [Recent Change In Weight] : ~T recent weight change [Diarrhea] : diarrhea [Negative] : Musculoskeletal [Fever] : no fever [Chills] : no chills [Chest Pain] : no chest pain [Palpitations] : no palpitations [Shortness Of Breath] : no shortness of breath [Cough] : no cough [Abdominal Pain] : no abdominal pain [Nausea] : no nausea [Constipation] : no constipation [Vomiting] : no vomiting [Heartburn] : no heartburn [Melena] : no melena [Dysuria] : no dysuria [Vaginal Discharge] : no vaginal discharge [Skin Rash] : no skin rash [Headache] : no headache [Dizziness] : no dizziness [Fainting] : no fainting [Anxiety] : no anxiety [Depression] : no depression [Easy Bleeding] : no easy bleeding [Easy Bruising] : no easy bruising [Swollen Glands] : no swollen glands [FreeTextEntry2] : chronic fatigue [FreeTextEntry3] : sees optho, watching some abnl retina [FreeTextEntry7] : occasional diarrhea

## 2022-02-21 NOTE — HISTORY OF PRESENT ILLNESS
[FreeTextEntry1] : physical [de-identified] : 50 yo female with h/o as below here for CPE.\par Feeling well overall, no complaints.\par Had full cardiac w/up, nothing found, advised diet/exercise.  Was bike riding for a while, but not much activity in recent months.  \par Good eater, will eat healthy foods, but difficult due to time.\par No other active issues.

## 2022-02-27 ENCOUNTER — NON-APPOINTMENT (OUTPATIENT)
Age: 52
End: 2022-02-27

## 2022-03-14 ENCOUNTER — APPOINTMENT (OUTPATIENT)
Dept: BARIATRICS/WEIGHT MGMT | Facility: CLINIC | Age: 52
End: 2022-03-14
Payer: COMMERCIAL

## 2022-03-14 VITALS — WEIGHT: 215 LBS | HEIGHT: 61 IN | BODY MASS INDEX: 40.59 KG/M2

## 2022-03-14 DIAGNOSIS — Z86.39 PERSONAL HISTORY OF OTHER ENDOCRINE, NUTRITIONAL AND METABOLIC DISEASE: ICD-10-CM

## 2022-03-14 PROCEDURE — 99205 OFFICE O/P NEW HI 60 MIN: CPT | Mod: 95

## 2022-03-14 NOTE — REVIEW OF SYSTEMS
[Patient Intake Form Reviewed] : Patient intake form was reviewed [Negative] : Allergic/Immunologic [MED-ROS-Cons-FT] : wt gain

## 2022-03-14 NOTE — REASON FOR VISIT
[Initial Consultation] : an initial consultation for [Home] : at home, [unfilled] , at the time of the visit. [Medical Office: ([facility])___] : at the medical office located in

## 2022-03-14 NOTE — HISTORY OF PRESENT ILLNESS
[FreeTextEntry1] : Bariatric surgery history: none\par Obesity co-morbidities: HLD, vit d def, GERD\par Comorbidities improved or resolved: none\par Anti-obesity medications: none\par Obesity medication side effects: none\par \par Ms. LUIS SIEGEL is a 51 year year old female who presents for evaluation and treatment of Class 3 obesity. \par +Does have an IUD unsure of menopause status\par \par Obesity related co-morbidities: Vit D deficiency, mild GERD - only OTCs\par \par Patient lives -  and kids - 2 boys. 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 \par \par Weight History:\par Lowest adult weight: 130\par Highest adult weight: 215\par \par Has gained 0-5 pounds over the past year.\par \par Obesity began in 20s  Weight gain has occurred with: more sedentary.  First baby weight came off pretty quickly.  nd baby in 2005.  Went up to 200, down to 180s.  In her early 40s, changed diet and got rid of sugar and lost about 35 lbs to 170.  Maintained about a year then gradually increased. \par \par Past weight loss attempts include: WW.  These have produced a maximum of 35 pounds of weight loss.  \par Anti-obesity medications in the past: No. Way back in 20s, herbal pills.  Open to it. \par \par Reasons for desiring weight loss:  not get to winded up the stairs.  wants to be more active. wanting to get in a shape to hike more. likes the outdoors.  Summer.  \par Perceived obstacles to losing weight: sugary stuff\par \par Sleep: 7-8 hours. has gotten better in the last few months.   notices that she tends to breath heavier. \par +has been meditating.  \par \par Has 1 regular meals a day.  Dinner only. \par \par Diet history:\par wakes up at: 6 am \par B: cup of coffee while she goes to bed\par L: "doesn't have time"\par snacks -  2 pm - yogurt smoothies, nuts, cheese sticks\par at home - really hungry when she didn't bring snacks - chips/crackers/ turkey with cheese\par D: both cook 730-8 pm - rice with beans and a protein. mashed potatoes or vegetables, and protein. stays away from red meat. lots of chicken, fish and shrimp as well.  pork sometimes. \par \par bedtime - 10 30pm\par \par On weekends has breakfast - egg scramble w peppers onions tomatoes.  sometimes lunch, usually eats out on weekends - will share appetizer, regular meal.  +italian restaurants is one of her favorites. \par \par snacks: yes\par eating after dinner: no\par overeating episodes: sometimes.  maybe 2-3 times a week. \par \par Sodas/fast food/processed foods: take out once a week. varies. +fried, chips\par \par Water intake per day: 4-5 x8 oz cups per day\par 1 cup coffee per day - non dairy creamer.\par \par Physical activity:\par Patient enjoys: walking, biking\par Current physical activity: walking twice a week 30 minutes. \par Have weights, yoga mat. \par \par Habits patient would like to change: eating more regularly\par Level of interest in losing weight: 5/5\par Community support: 4/5\par \par Factors that have helped in the past with losing weight and keeping it off: eating more vegetables and eating consistently\par \par

## 2022-03-14 NOTE — ASSESSMENT
[FreeTextEntry1] : 51 y.o female w Class 3 obesity, elec crp, elev LDL, vit d def, presents for weight management\par \par - needs to add bkfast and lunch.  Microwave and 10 minutes.   Prioritize. Discussed the why's. \par - Could definitely start Ozempic, and/or metformin.  A1c 5.4%.  Patient would like to see what changing meal times does first\par - drink 2L water per day.\par - will need to increase physical activity as well, less of a priority at this moment, can work on later\par - see MARCE Kim\par \par Extensive dietary counseling was provided:\par -discussed calorie reduction options: plant-based whole food diet vs. Dash / Mediterranean w/ calorie reduction\par -discussed the usefulness of calorie counting phone applications\par -provided patient with daily estimated calorie requirements and recommended calorie reduction amount\par -three meal components emphasized: large portion vegetables/fruit, smaller portion protein favoring plant-based, smaller portion high fiber carbohydrates\par -properties of macronutrients were reviewed to help the patient understand why a balanced diet is important\par -discussed the avoidance of red and processed meat, sugar sweetened beverages, refined carbohydrates, high fat dairy\par -advised avoidance of snack products and packaged / processed foods\par -counseled about meal timing and portion: large breakfast, medium lunch, small dinner\par -advised to avoid carbohydrates in evening if possible\par -regular water or seltzer throughout the day\par -for stimulus control, advised to keep unhealthy foods out of the house or out of view\par -recommended abstaining entirely from restaurant / fast food / take-out meals\par \par \par Lifestyle recommendations for weight loss were extensively reviewed\par -aerobic exercise reviewed: moderate intensity versus high intensity exercise - an estimate of daily / weekly time requirements was reviewed\par -emphasized that resistance training in addition to aerobic may provide added benefit\par -emphasized that long term weight loss and maintenance depend upon exercise\par -the need for adequate sleep (6+ hours) was reviewed\par \par f/u in 2 weeks

## 2022-03-28 ENCOUNTER — APPOINTMENT (OUTPATIENT)
Dept: BARIATRICS/WEIGHT MGMT | Facility: CLINIC | Age: 52
End: 2022-03-28
Payer: COMMERCIAL

## 2022-03-28 VITALS — WEIGHT: 213 LBS | BODY MASS INDEX: 40.25 KG/M2

## 2022-03-28 PROCEDURE — 99214 OFFICE O/P EST MOD 30 MIN: CPT | Mod: 95

## 2022-03-28 NOTE — ASSESSMENT
[FreeTextEntry1] : 51 y.o female w Class 3 obesity, elec crp, elev LDL, vit d def, presents for weight management\par \par - [facility].  Start 0.25mg.  All questions answered, side effects reviewed.\par - continue bkfast and lunch.  Microwave and 10 minutes.   \par - drink 2L water per day.\par - will need to increase physical activity as well, less of a priority at this moment, can work on later\par - see MARCE Kim\par \par Extensive dietary counseling was provided:\par -discussed calorie reduction options: plant-based whole food diet vs. Dash / Mediterranean w/ calorie reduction\par -discussed the usefulness of calorie counting phone applications\par -provided patient with daily estimated calorie requirements and recommended calorie reduction amount\par -three meal components emphasized: large portion vegetables/fruit, smaller portion protein favoring plant-based, smaller portion high fiber carbohydrates\par -properties of macronutrients were reviewed to help the patient understand why a balanced diet is important\par -discussed the avoidance of red and processed meat, sugar sweetened beverages, refined carbohydrates, high fat dairy\par -advised avoidance of snack products and packaged / processed foods\par -counseled about meal timing and portion: large breakfast, medium lunch, small dinner\par -advised to avoid carbohydrates in evening if possible\par -regular water or seltzer throughout the day\par -for stimulus control, advised to keep unhealthy foods out of the house or out of view\par -recommended abstaining entirely from restaurant / fast food / take-out meals\par \par \par Lifestyle recommendations for weight loss were extensively reviewed\par -aerobic exercise reviewed: moderate intensity versus high intensity exercise - an estimate of daily / weekly time requirements was reviewed\par -emphasized that resistance training in addition to aerobic may provide added benefit\par -emphasized that long term weight loss and maintenance depend upon exercise\par -the need for adequate sleep (6+ hours) was reviewed\par \par f/u in 4 weeks

## 2022-03-28 NOTE — HISTORY OF PRESENT ILLNESS
[FreeTextEntry1] : Bariatric surgery history: none\par Obesity co-morbidities: HLD, vit d def, GERD\par Comorbidities improved or resolved: none\par Anti-obesity medications: none\par Obesity medication side effects: none\par \par Ms. LUIS SIEGEL is a 51 year year old female who presents for evaluation and treatment of Class 3 obesity. \par +Does have an IUD unsure of menopause status\par \par Obesity related co-morbidities: Vit D deficiency, mild GERD - only OTCs\par \par Patient lives -  and kids - 2 boys. 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 \par \par Weight History:\par Lowest adult weight: 130\par Highest adult weight: 215\par \par Interim\par - has been eating bkfast, lunch and dinner.  Finds herself more hungry throughout the day and aware.  Feels good that she's seeing lower numbers on the scale, motivated to get out the bikes and go out for more walks w \par - having snacks on hand - > fruits, nuts\par - bkfast around 10am\par - dinner - leftovers. \par -  started walking with her more often, took out the bikes last weekend.\par \par Has gained 0-5 pounds over the past year.\par \par Obesity began in 20s  Weight gain has occurred with: more sedentary.  First baby weight came off pretty quickly.  nd baby in 2005.  Went up to 200, down to 180s.  In her early 40s, changed diet and got rid of sugar and lost about 35 lbs to 170.  Maintained about a year then gradually increased. \par \par Past weight loss attempts include: WW.  These have produced a maximum of 35 pounds of weight loss.  \par Anti-obesity medications in the past: No. Way back in 20s, herbal pills.  Open to it. \par \par Reasons for desiring weight loss:  not get to winded up the stairs.  wants to be more active. wanting to get in a shape to hike more. likes the outdoors.  Summer.  \par Perceived obstacles to losing weight: sugary stuff\par \par Sleep: 7-8 hours. has gotten better in the last few months.   notices that she tends to breath heavier. \par +has been meditating.  \par \par Has 1 regular meals a day.  Dinner only.  Now - > 3 meals a day. \par \par Diet history:\par wakes up at: 6 am \par B: cup of coffee while she goes to bed\par L: "doesn't have time"\par snacks -  2 pm - yogurt smoothies, nuts, cheese sticks\par at home - really hungry when she didn't bring snacks - chips/crackers/ turkey with cheese\par D: both cook 730-8 pm - rice with beans and a protein. mashed potatoes or vegetables, and protein. stays away from red meat. lots of chicken, fish and shrimp as well.  pork sometimes. \par \par bedtime - 10 30pm\par \par On weekends has breakfast - egg scramble w peppers onions tomatoes.  sometimes lunch, usually eats out on weekends - will share appetizer, regular meal.  +italian restaurants is one of her favorites. \par \par snacks: yes\par eating after dinner: no\par overeating episodes: sometimes.  maybe 2-3 times a week. \par \par Sodas/fast food/processed foods: take out once a week. varies. +fried, chips\par \par Water intake per day: 4-5 x8 oz cups per day\par 1 cup coffee per day - non dairy creamer.\par \par Physical activity:\par Patient enjoys: walking, biking\par Current physical activity: walking twice a week 30 minutes. \par Have weights, yoga mat. \par \par Habits patient would like to change: eating more regularly\par Level of interest in losing weight: 5/5\par Community support: 4/5\par \par Factors that have helped in the past with losing weight and keeping it off: eating more vegetables and eating consistently\par \par

## 2022-03-28 NOTE — REASON FOR VISIT
[Home] : at home, [unfilled] , at the time of the visit. [Medical Office: ([facility])___] : at the medical office located in  [Follow-Up Visit] : a follow-up visit for

## 2022-03-31 ENCOUNTER — APPOINTMENT (OUTPATIENT)
Dept: BARIATRICS/WEIGHT MGMT | Facility: CLINIC | Age: 52
End: 2022-03-31
Payer: COMMERCIAL

## 2022-03-31 VITALS — WEIGHT: 213 LBS | HEIGHT: 61 IN | BODY MASS INDEX: 40.22 KG/M2

## 2022-03-31 PROCEDURE — 97802 MEDICAL NUTRITION INDIV IN: CPT | Mod: 95

## 2022-04-25 ENCOUNTER — APPOINTMENT (OUTPATIENT)
Dept: BARIATRICS/WEIGHT MGMT | Facility: CLINIC | Age: 52
End: 2022-04-25
Payer: COMMERCIAL

## 2022-04-25 ENCOUNTER — APPOINTMENT (OUTPATIENT)
Dept: GASTROENTEROLOGY | Facility: CLINIC | Age: 52
End: 2022-04-25
Payer: COMMERCIAL

## 2022-04-25 VITALS
WEIGHT: 205 LBS | DIASTOLIC BLOOD PRESSURE: 80 MMHG | SYSTOLIC BLOOD PRESSURE: 125 MMHG | TEMPERATURE: 98 F | BODY MASS INDEX: 37.73 KG/M2 | HEIGHT: 62 IN | HEART RATE: 101 BPM | OXYGEN SATURATION: 98 %

## 2022-04-25 VITALS — BODY MASS INDEX: 38.92 KG/M2 | WEIGHT: 206 LBS

## 2022-04-25 PROCEDURE — 99204 OFFICE O/P NEW MOD 45 MIN: CPT

## 2022-04-25 PROCEDURE — 99214 OFFICE O/P EST MOD 30 MIN: CPT | Mod: 95

## 2022-04-25 NOTE — ASSESSMENT
[FreeTextEntry1] : 51F with BMI > 35, rosacea, thrombocytosis referred by Dr. Evelina Palomino for colon cancer screening. \par \par #CRC screening: Index, average risk. No prior colonoscopy. No FH of CRC. No anemia. \par --Schedule colonoscopy with 1d prep\par --The patient will be scheduled for a screening/surveillance colonoscopy.  I had an extensive discussion with the patient including risks, benefits and alternatives possibly including bleeding, perforation, need for blood transfusion or surgery, infection, and medication and anesthetic risk. The limitations of colonoscopy were discussed including missed polyps and cancer.  Possible medication and anesthesia related adverse cardiovascular and respiratory reactions were reviewed.  The patient wishes to proceed and will be scheduled for a colonoscopy.  The rationale of colonoscopy was discussed not only in terms of the early detection of colon cancer, but also as a means of prevention in the event of removal of a polyp.  The limitations of colonoscopy was discussed and the patient is aware that not every cancer is prevented.\par \par #GERD without red flags\par --GERD diet and lifestyle modifications, including: avoidance of acid reflux-inducing foods (excessive caffeine, chocolate, alcohol, peppermint, fatty foods, excessive spices such as garlic, tomato sauce, onions, peppers), avoidance of late meals (eating at least 3+ hours prior to bedtime), head of bed elevation if excessive night-time symptoms, continued weight loss

## 2022-04-25 NOTE — ASSESSMENT
[FreeTextEntry1] : 51 y.o female w Class 3 obesity, elec crp, elev LDL, vit d def, presents for weight management\par \par - [facility].  Increase to 0.5mg\par - continue bkfast and lunch.  Microwave and 10 minutes.   \par - drink 2L water per day.\par - will need to increase physical activity as well, less of a priority at this moment, can work on later\par - see MARCE Kim\par \par Extensive dietary counseling was provided:\par -discussed calorie reduction options: plant-based whole food diet vs. Dash / Mediterranean w/ calorie reduction\par -discussed the usefulness of calorie counting phone applications\par -provided patient with daily estimated calorie requirements and recommended calorie reduction amount\par -three meal components emphasized: large portion vegetables/fruit, smaller portion protein favoring plant-based, smaller portion high fiber carbohydrates\par -properties of macronutrients were reviewed to help the patient understand why a balanced diet is important\par -discussed the avoidance of red and processed meat, sugar sweetened beverages, refined carbohydrates, high fat dairy\par -advised avoidance of snack products and packaged / processed foods\par -counseled about meal timing and portion: large breakfast, medium lunch, small dinner\par -advised to avoid carbohydrates in evening if possible\par -regular water or seltzer throughout the day\par -for stimulus control, advised to keep unhealthy foods out of the house or out of view\par -recommended abstaining entirely from restaurant / fast food / take-out meals\par \par \par Lifestyle recommendations for weight loss were extensively reviewed\par -aerobic exercise reviewed: moderate intensity versus high intensity exercise - an estimate of daily / weekly time requirements was reviewed\par -emphasized that resistance training in addition to aerobic may provide added benefit\par -emphasized that long term weight loss and maintenance depend upon exercise\par -the need for adequate sleep (6+ hours) was reviewed\par \par f/u in 4 weeks

## 2022-04-25 NOTE — HISTORY OF PRESENT ILLNESS
[FreeTextEntry1] : 51F with BMI > 35, rosacea, thrombocytosis referred by Dr. Evelina Palomino for colon cancer screening.\par \par Patient reports intermittent heartburn symptoms, which can be triggered by diet (including red sauce) or stress. Symptoms previously more frequent, but she has noted improvement in her symptoms after starting Ozempic and losing 10lb. She takes Tums or OTC antacid with relief. She reports her bowel movements are occasionally loose in the setting of stress, but otherwise non-bloody and regular. No reports of nausea, vomiting, dysphagia, unintentional weight loss.\par \par No prior anesthesia complications. No SOB, CP. No reports of KYLIE. \par \par Patient has never had a colonoscopy or EGD. No FH of CRC. \par \par Patient works at [employer] as a  at [employer]. \par \par Not on ASA or other blood thinning medications. \par \par PSH: appendectomy (1996), rotator cuff injury \par

## 2022-04-25 NOTE — CONSULT LETTER
[Dear  ___] : Dear  [unfilled], [Consult Letter:] : I had the pleasure of evaluating your patient, [unfilled]. [Please see my note below.] : Please see my note below. [Consult Closing:] : Thank you very much for allowing me to participate in the care of this patient.  If you have any questions, please do not hesitate to contact me. [Sincerely,] : Sincerely, [FreeTextEntry3] : Ambar Lamas MD\par Division of Gastroenterology\par [facility]\par

## 2022-04-25 NOTE — PHYSICAL EXAM
[General Appearance - Alert] : alert [General Appearance - In No Acute Distress] : in no acute distress [Sclera] : the sclera and conjunctiva were normal [Neck Appearance] : the appearance of the neck was normal [Respiration, Rhythm And Depth] : normal respiratory rhythm and effort [Exaggerated Use Of Accessory Muscles For Inspiration] : no accessory muscle use [Abdomen Soft] : soft [Abdomen Tenderness] : non-tender [] : no hepato-splenomegaly [Abdomen Mass (___ Cm)] : no abdominal mass palpated [Involuntary Movements] : no involuntary movements were seen [Oriented To Time, Place, And Person] : oriented to person, place, and time [Impaired Insight] : insight and judgment were intact [Affect] : the affect was normal [FreeTextEntry1] : BMI > 35

## 2022-04-25 NOTE — REASON FOR VISIT
[Follow-Up Visit] : a follow-up visit for [Obesity] : obesity [Home] : at home, [unfilled] , at the time of the visit. [Medical Office: ([facility])___] : at the medical office located in

## 2022-04-25 NOTE — HISTORY OF PRESENT ILLNESS
[FreeTextEntry1] : Bariatric surgery history: none\par Obesity co-morbidities: HLD, vit d def, GERD\par Comorbidities improved or resolved: none\par Anti-obesity medications: Ozempic\par Obesity medication side effects: none\par \par Ms. LUIS SIEGEL is a 51 year year old female who presents for evaluation and treatment of Class 3 obesity, now Class 2 obesity. \par +Does have an IUD unsure of menopause status\par \par Obesity related co-morbidities: Vit D deficiency, mild GERD - only OTCs\par \par Patient lives -  and kids - 2 boys. 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 \par \par Weight History:\par Lowest adult weight: 130\par Highest adult weight: 215\par \par Interim\par - started Ozempic 0.25mg, curbing her appetite.  Been on it for 4 weeks. \par - has been eating bkfast, lunch and dinner.  Finds herself more hungry throughout the day and aware.  Feels good that she's seeing lower numbers on the scale, motivated to get out the bikes and go out for more walks w \par - wants to restart bike rides on the weekends.  In the summer, hoping to go to [employer] trip. \par - having snacks on hand - > fruits, nuts\par - bkfast around 10am\par - dinner - eating before 8pm\par -  started walking with her more often, took out the bikes last weekend.\par \par Has gained 0-5 pounds over the past year.\par \par Obesity began in 20s  Weight gain has occurred with: more sedentary.  First baby weight came off pretty quickly.  nd baby in 2005.  Went up to 200, down to 180s.  In her early 40s, changed diet and got rid of sugar and lost about 35 lbs to 170.  Maintained about a year then gradually increased. \par \par Past weight loss attempts include: WW.  These have produced a maximum of 35 pounds of weight loss.  \par Anti-obesity medications in the past: No. Way back in 20s, herbal pills.  Open to it. \par \par Reasons for desiring weight loss:  not get to winded up the stairs.  wants to be more active. wanting to get in a shape to hike more. likes the outdoors.  Summer.  \par Perceived obstacles to losing weight: sugary stuff\par \par Sleep: 7-8 hours. has gotten better in the last few months.   notices that she tends to breath heavier. \par +has been meditating.  \par \par Has 1 regular meals a day.  Dinner only.  Now - > 3 meals a day. \par \par Diet history:\par wakes up at: 6 am \par B: cup of coffee while she goes to bed\par L: "doesn't have time"\par snacks -  2 pm - yogurt smoothies, nuts, cheese sticks\par at home - really hungry when she didn't bring snacks - chips/crackers/ turkey with cheese\par D: both cook 730-8 pm - rice with beans and a protein. mashed potatoes or vegetables, and protein. stays away from red meat. lots of chicken, fish and shrimp as well.  pork sometimes. \par \par bedtime - 10 30pm\par \par On weekends has breakfast - egg scramble w peppers onions tomatoes.  sometimes lunch, usually eats out on weekends - will share appetizer, regular meal.  +italian restaurants is one of her favorites. \par \par snacks: yes\par eating after dinner: no\par overeating episodes: sometimes.  maybe 2-3 times a week. \par \par Sodas/fast food/processed foods: take out once a week. varies. +fried, chips\par \par Water intake per day: 4-5 x8 oz cups per day\par 1 cup coffee per day - non dairy creamer.\par \par Physical activity:\par Patient enjoys: walking, biking\par Current physical activity: walking twice a week 30 minutes. \par Have weights, yoga mat. \par \par Habits patient would like to change: eating more regularly\par Level of interest in losing weight: 5/5\par Community support: 4/5\par \par Factors that have helped in the past with losing weight and keeping it off: eating more vegetables and eating consistently\par \par

## 2022-04-26 ENCOUNTER — RESULT REVIEW (OUTPATIENT)
Age: 52
End: 2022-04-26

## 2022-05-03 ENCOUNTER — APPOINTMENT (OUTPATIENT)
Dept: BARIATRICS/WEIGHT MGMT | Facility: CLINIC | Age: 52
End: 2022-05-03
Payer: COMMERCIAL

## 2022-05-03 PROCEDURE — 97803 MED NUTRITION INDIV SUBSEQ: CPT | Mod: 95

## 2022-05-04 VITALS — HEIGHT: 62 IN | BODY MASS INDEX: 37.73 KG/M2 | WEIGHT: 205 LBS

## 2022-05-23 ENCOUNTER — APPOINTMENT (OUTPATIENT)
Dept: BARIATRICS/WEIGHT MGMT | Facility: CLINIC | Age: 52
End: 2022-05-23
Payer: COMMERCIAL

## 2022-05-23 VITALS — BODY MASS INDEX: 36.95 KG/M2 | WEIGHT: 202 LBS

## 2022-05-23 PROCEDURE — 99214 OFFICE O/P EST MOD 30 MIN: CPT | Mod: 95

## 2022-05-23 NOTE — ASSESSMENT
[FreeTextEntry1] : 51 y.o female w Class 3 obesity, elec crp, elev LDL, vit d def, presents for weight management\par \par - [facility].  Increase to 1mg.  Has already lost >5% body weight in about 2 months\par - continue bkfast and lunch.  Microwave and 10 minutes.   \par - drink 2L water per day.\par - will need to increase physical activity as well, less of a priority at this moment, can work on later\par - see MARCE Kim\par \par f/u in 4 weeks

## 2022-05-23 NOTE — HISTORY OF PRESENT ILLNESS
[FreeTextEntry1] : Bariatric surgery history: none\par Obesity co-morbidities: HLD, vit d def, GERD\par Comorbidities improved or resolved: none\par Anti-obesity medications: Ozempic\par Obesity medication side effects: none\par \par Ms. LUIS SIEGEL is a 51 year year old female who presents for evaluation and treatment of Class 3 obesity, now Class 2 obesity. \par +Does have an IUD unsure of menopause status\par \par Obesity related co-morbidities: Vit D deficiency, mild GERD - only OTCs\par \par Patient lives -  and kids - 2 boys. 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 \par \par Weight History:\par Lowest adult weight: 130\par Highest adult weight: 215\par \par Interim\par - been on Ozempic 0.5mg.  decr appetite, eating healthier inclu going out to dinner.  has leftovers.  \par - has been eating bkfast, lunch and dinner.  motivated to get out the bikes and go out for more walks w \par - wants to restart bike rides on the weekends.  In the summer, hoping to go to [employer] trip. 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  \par \par Has gained 0-5 pounds over the past year.\par \par Obesity began in 20s  Weight gain has occurred with: more sedentary.  First baby weight came off pretty quickly.  nd baby in 2005.  Went up to 200, down to 180s.  In her early 40s, changed diet and got rid of sugar and lost about 35 lbs to 170.  Maintained about a year then gradually increased. \par \par Past weight loss attempts include: WW.  These have produced a maximum of 35 pounds of weight loss.  \par Anti-obesity medications in the past: No. Way back in 20s, herbal pills.  Open to it. \par \par Reasons for desiring weight loss:  not get to winded up the stairs.  wants to be more active. wanting to get in a shape to hike more. likes the outdoors.  Summer.  \par Perceived obstacles to losing weight: sugary stuff\par \par Sleep: 7-8 hours. has gotten better in the last few months.   notices that she tends to breath heavier. \par +has been meditating.  \par \par Has 1 regular meals a day.  Dinner only.  Now - > 3 meals a day. \par \par Diet history:\par wakes up at: 6 am \par B: cup of coffee while she goes to bed\par L: "doesn't have time"\par snacks -  2 pm - yogurt smoothies, nuts, cheese sticks\par at home - really hungry when she didn't bring snacks - chips/crackers/ turkey with cheese\par D: both cook 730-8 pm - rice with beans and a protein. mashed potatoes or vegetables, and protein. stays away from red meat. lots of chicken, fish and shrimp as well.  pork sometimes. \par \par bedtime - 10 30pm\par \par On weekends has breakfast - egg scramble w peppers onions tomatoes.  sometimes lunch, usually eats out on weekends - will share appetizer, regular meal.  +italian restaurants is one of her favorites. \par \par snacks: yes\par eating after dinner: no\par overeating episodes: sometimes.  maybe 2-3 times a week. \par \par Sodas/fast food/processed foods: take out once a week. varies. +fried, chips\par \par Water intake per day: 4-5 x8 oz cups per day\par 1 cup coffee per day - non dairy creamer.\par \par Physical activity:\par Patient enjoys: walking, biking\par Current physical activity: walking twice a week 30 minutes. \par Have weights, yoga mat. \par \par Habits patient would like to change: eating more regularly\par Level of interest in losing weight: 5/5\par Community support: 4/5\par \par Factors that have helped in the past with losing weight and keeping it off: eating more vegetables and eating consistently\par \par

## 2022-05-23 NOTE — REASON FOR VISIT
[Follow-Up Visit] : a follow-up visit for [Obesity] : obesity [Home] : at home, [unfilled] , at the time of the visit. [Medical Office: ([facility])___] : at the medical office located in

## 2022-05-25 ENCOUNTER — RX RENEWAL (OUTPATIENT)
Age: 52
End: 2022-05-25

## 2022-05-28 ENCOUNTER — OUTPATIENT (OUTPATIENT)
Dept: OUTPATIENT SERVICES | Facility: HOSPITAL | Age: 52
LOS: 1 days | End: 2022-05-28
Payer: COMMERCIAL

## 2022-05-28 ENCOUNTER — APPOINTMENT (OUTPATIENT)
Dept: MAMMOGRAPHY | Facility: CLINIC | Age: 52
End: 2022-05-28
Payer: COMMERCIAL

## 2022-05-28 ENCOUNTER — APPOINTMENT (OUTPATIENT)
Dept: ULTRASOUND IMAGING | Facility: CLINIC | Age: 52
End: 2022-05-28
Payer: COMMERCIAL

## 2022-05-28 DIAGNOSIS — Z00.8 ENCOUNTER FOR OTHER GENERAL EXAMINATION: ICD-10-CM

## 2022-05-28 PROCEDURE — 77067 SCR MAMMO BI INCL CAD: CPT | Mod: 26

## 2022-05-28 PROCEDURE — 76641 ULTRASOUND BREAST COMPLETE: CPT | Mod: 26,50

## 2022-05-28 PROCEDURE — 77063 BREAST TOMOSYNTHESIS BI: CPT | Mod: 26

## 2022-05-28 PROCEDURE — 77063 BREAST TOMOSYNTHESIS BI: CPT

## 2022-05-28 PROCEDURE — 76641 ULTRASOUND BREAST COMPLETE: CPT

## 2022-05-28 PROCEDURE — 77067 SCR MAMMO BI INCL CAD: CPT

## 2022-06-02 ENCOUNTER — APPOINTMENT (OUTPATIENT)
Dept: MAMMOGRAPHY | Facility: CLINIC | Age: 52
End: 2022-06-02
Payer: COMMERCIAL

## 2022-06-02 ENCOUNTER — OUTPATIENT (OUTPATIENT)
Dept: OUTPATIENT SERVICES | Facility: HOSPITAL | Age: 52
LOS: 1 days | End: 2022-06-02
Payer: COMMERCIAL

## 2022-06-02 ENCOUNTER — APPOINTMENT (OUTPATIENT)
Dept: ULTRASOUND IMAGING | Facility: CLINIC | Age: 52
End: 2022-06-02
Payer: COMMERCIAL

## 2022-06-02 DIAGNOSIS — R92.8 OTHER ABNORMAL AND INCONCLUSIVE FINDINGS ON DIAGNOSTIC IMAGING OF BREAST: ICD-10-CM

## 2022-06-02 DIAGNOSIS — Z00.8 ENCOUNTER FOR OTHER GENERAL EXAMINATION: ICD-10-CM

## 2022-06-02 PROCEDURE — 77065 DX MAMMO INCL CAD UNI: CPT

## 2022-06-02 PROCEDURE — 76642 ULTRASOUND BREAST LIMITED: CPT | Mod: 26,RT

## 2022-06-02 PROCEDURE — 77065 DX MAMMO INCL CAD UNI: CPT | Mod: 26,RT

## 2022-06-02 PROCEDURE — 76642 ULTRASOUND BREAST LIMITED: CPT

## 2022-06-02 PROCEDURE — G0279: CPT

## 2022-06-02 PROCEDURE — G0279: CPT | Mod: 26

## 2022-06-07 ENCOUNTER — OUTPATIENT (OUTPATIENT)
Dept: OUTPATIENT SERVICES | Facility: HOSPITAL | Age: 52
LOS: 1 days | End: 2022-06-07
Payer: COMMERCIAL

## 2022-06-07 ENCOUNTER — APPOINTMENT (OUTPATIENT)
Dept: MAMMOGRAPHY | Facility: IMAGING CENTER | Age: 52
End: 2022-06-07
Payer: COMMERCIAL

## 2022-06-07 ENCOUNTER — RESULT REVIEW (OUTPATIENT)
Age: 52
End: 2022-06-07

## 2022-06-07 DIAGNOSIS — Z00.8 ENCOUNTER FOR OTHER GENERAL EXAMINATION: ICD-10-CM

## 2022-06-07 PROCEDURE — 88305 TISSUE EXAM BY PATHOLOGIST: CPT | Mod: 26

## 2022-06-07 PROCEDURE — 77065 DX MAMMO INCL CAD UNI: CPT | Mod: 26,RT

## 2022-06-07 PROCEDURE — 88305 TISSUE EXAM BY PATHOLOGIST: CPT

## 2022-06-07 PROCEDURE — A4648: CPT

## 2022-06-07 PROCEDURE — 19081 BX BREAST 1ST LESION STRTCTC: CPT | Mod: RT

## 2022-06-07 PROCEDURE — 88360 TUMOR IMMUNOHISTOCHEM/MANUAL: CPT | Mod: 26

## 2022-06-07 PROCEDURE — 19081 BX BREAST 1ST LESION STRTCTC: CPT

## 2022-06-07 PROCEDURE — 77065 DX MAMMO INCL CAD UNI: CPT

## 2022-06-07 PROCEDURE — 88360 TUMOR IMMUNOHISTOCHEM/MANUAL: CPT

## 2022-06-13 ENCOUNTER — APPOINTMENT (OUTPATIENT)
Dept: SURGERY | Facility: CLINIC | Age: 52
End: 2022-06-13
Payer: COMMERCIAL

## 2022-06-13 PROCEDURE — 99205K: CUSTOM

## 2022-06-14 ENCOUNTER — OUTPATIENT (OUTPATIENT)
Dept: OUTPATIENT SERVICES | Facility: HOSPITAL | Age: 52
LOS: 1 days | End: 2022-06-14
Payer: COMMERCIAL

## 2022-06-14 ENCOUNTER — APPOINTMENT (OUTPATIENT)
Dept: MRI IMAGING | Facility: IMAGING CENTER | Age: 52
End: 2022-06-14
Payer: COMMERCIAL

## 2022-06-14 DIAGNOSIS — Z00.8 ENCOUNTER FOR OTHER GENERAL EXAMINATION: ICD-10-CM

## 2022-06-14 PROCEDURE — 77049 MRI BREAST C-+ W/CAD BI: CPT | Mod: 26

## 2022-06-14 PROCEDURE — C8908: CPT

## 2022-06-14 PROCEDURE — C8937: CPT

## 2022-06-14 PROCEDURE — A9585: CPT

## 2022-06-20 ENCOUNTER — APPOINTMENT (OUTPATIENT)
Dept: MAMMOGRAPHY | Facility: IMAGING CENTER | Age: 52
End: 2022-06-20
Payer: COMMERCIAL

## 2022-06-20 ENCOUNTER — OUTPATIENT (OUTPATIENT)
Dept: OUTPATIENT SERVICES | Facility: HOSPITAL | Age: 52
LOS: 1 days | End: 2022-06-20
Payer: COMMERCIAL

## 2022-06-20 ENCOUNTER — APPOINTMENT (OUTPATIENT)
Dept: MRI IMAGING | Facility: IMAGING CENTER | Age: 52
End: 2022-06-20
Payer: COMMERCIAL

## 2022-06-20 ENCOUNTER — RESULT REVIEW (OUTPATIENT)
Age: 52
End: 2022-06-20

## 2022-06-20 ENCOUNTER — OUTPATIENT (OUTPATIENT)
Dept: OUTPATIENT SERVICES | Facility: HOSPITAL | Age: 52
LOS: 1 days | End: 2022-06-20

## 2022-06-20 VITALS
WEIGHT: 197.09 LBS | SYSTOLIC BLOOD PRESSURE: 134 MMHG | RESPIRATION RATE: 16 BRPM | OXYGEN SATURATION: 98 % | HEIGHT: 62 IN | HEART RATE: 76 BPM | DIASTOLIC BLOOD PRESSURE: 86 MMHG | TEMPERATURE: 98 F

## 2022-06-20 DIAGNOSIS — C50.311 MALIGNANT NEOPLASM OF LOWER-INNER QUADRANT OF RIGHT FEMALE BREAST: ICD-10-CM

## 2022-06-20 DIAGNOSIS — Z98.890 OTHER SPECIFIED POSTPROCEDURAL STATES: Chronic | ICD-10-CM

## 2022-06-20 DIAGNOSIS — Z90.49 ACQUIRED ABSENCE OF OTHER SPECIFIED PARTS OF DIGESTIVE TRACT: Chronic | ICD-10-CM

## 2022-06-20 DIAGNOSIS — Z00.8 ENCOUNTER FOR OTHER GENERAL EXAMINATION: ICD-10-CM

## 2022-06-20 LAB
ALBUMIN SERPL ELPH-MCNC: 4.4 G/DL — SIGNIFICANT CHANGE UP (ref 3.3–5)
ALP SERPL-CCNC: 65 U/L — SIGNIFICANT CHANGE UP (ref 40–120)
ALT FLD-CCNC: 15 U/L — SIGNIFICANT CHANGE UP (ref 4–33)
ANION GAP SERPL CALC-SCNC: 12 MMOL/L — SIGNIFICANT CHANGE UP (ref 7–14)
AST SERPL-CCNC: 17 U/L — SIGNIFICANT CHANGE UP (ref 4–32)
BILIRUB SERPL-MCNC: 0.2 MG/DL — SIGNIFICANT CHANGE UP (ref 0.2–1.2)
BUN SERPL-MCNC: 10 MG/DL — SIGNIFICANT CHANGE UP (ref 7–23)
CALCIUM SERPL-MCNC: 9 MG/DL — SIGNIFICANT CHANGE UP (ref 8.4–10.5)
CHLORIDE SERPL-SCNC: 104 MMOL/L — SIGNIFICANT CHANGE UP (ref 98–107)
CO2 SERPL-SCNC: 22 MMOL/L — SIGNIFICANT CHANGE UP (ref 22–31)
CREAT SERPL-MCNC: 0.88 MG/DL — SIGNIFICANT CHANGE UP (ref 0.5–1.3)
EGFR: 81 ML/MIN/1.73M2 — SIGNIFICANT CHANGE UP
GLUCOSE SERPL-MCNC: 72 MG/DL — SIGNIFICANT CHANGE UP (ref 70–99)
HCG UR QL: NEGATIVE — SIGNIFICANT CHANGE UP
HCT VFR BLD CALC: 42.3 % — SIGNIFICANT CHANGE UP (ref 34.5–45)
HGB BLD-MCNC: 14 G/DL — SIGNIFICANT CHANGE UP (ref 11.5–15.5)
MCHC RBC-ENTMCNC: 30.8 PG — SIGNIFICANT CHANGE UP (ref 27–34)
MCHC RBC-ENTMCNC: 33.1 GM/DL — SIGNIFICANT CHANGE UP (ref 32–36)
MCV RBC AUTO: 93 FL — SIGNIFICANT CHANGE UP (ref 80–100)
NRBC # BLD: 0 /100 WBCS — SIGNIFICANT CHANGE UP
NRBC # FLD: 0 K/UL — SIGNIFICANT CHANGE UP
PLATELET # BLD AUTO: 438 K/UL — HIGH (ref 150–400)
POTASSIUM SERPL-MCNC: 3.4 MMOL/L — LOW (ref 3.5–5.3)
POTASSIUM SERPL-SCNC: 3.4 MMOL/L — LOW (ref 3.5–5.3)
PROT SERPL-MCNC: 7.6 G/DL — SIGNIFICANT CHANGE UP (ref 6–8.3)
RBC # BLD: 4.55 M/UL — SIGNIFICANT CHANGE UP (ref 3.8–5.2)
RBC # FLD: 12.1 % — SIGNIFICANT CHANGE UP (ref 10.3–14.5)
SODIUM SERPL-SCNC: 138 MMOL/L — SIGNIFICANT CHANGE UP (ref 135–145)
WBC # BLD: 9.77 K/UL — SIGNIFICANT CHANGE UP (ref 3.8–10.5)
WBC # FLD AUTO: 9.77 K/UL — SIGNIFICANT CHANGE UP (ref 3.8–10.5)

## 2022-06-20 PROCEDURE — 77065 DX MAMMO INCL CAD UNI: CPT

## 2022-06-20 PROCEDURE — 19085 BX BREAST 1ST LESION MR IMAG: CPT | Mod: LT

## 2022-06-20 PROCEDURE — 77065 DX MAMMO INCL CAD UNI: CPT | Mod: 26,LT

## 2022-06-20 PROCEDURE — 19085 BX BREAST 1ST LESION MR IMAG: CPT

## 2022-06-20 PROCEDURE — 77065 DX MAMMO INCL CAD UNI: CPT | Mod: 26,LT,59

## 2022-06-20 PROCEDURE — 88305 TISSUE EXAM BY PATHOLOGIST: CPT | Mod: 26

## 2022-06-20 PROCEDURE — A4648: CPT

## 2022-06-20 PROCEDURE — A9585: CPT

## 2022-06-20 PROCEDURE — C1739: CPT

## 2022-06-20 PROCEDURE — 19281 PERQ DEVICE BREAST 1ST IMAG: CPT

## 2022-06-20 PROCEDURE — 19281 PERQ DEVICE BREAST 1ST IMAG: CPT | Mod: RT

## 2022-06-20 NOTE — H&P PST ADULT - ASSESSMENT
pre op dx of malignant neoplasm lower- inner quadrant right female breast is scheduled for right partial mastectomy with seed localization , sentinel lymph node biopsy    Malignant neoplasm lower- inner quadrant right female breast

## 2022-06-20 NOTE — H&P PST ADULT - NSICDXPASTMEDICALHX_GEN_ALL_CORE_FT
PAST MEDICAL HISTORY:  Malignant neoplasm of lower-inner quadrant of right female breast      PAST MEDICAL HISTORY:  Malignant neoplasm of lower-inner quadrant of right female breast     Obesity on ozempic

## 2022-06-20 NOTE — H&P PST ADULT - SKIN/BREAST COMMENTS
Pt reports her mammogram was abnormal . Biopsy done and revealed malignancy at lower inner quadrant right female breast.

## 2022-06-20 NOTE — H&P PST ADULT - NS MD HP INPLANTS MED DEV
dental implant, anchors right shoulder/Intrauterine Device Dental implant, anchors right shoulder/Intrauterine Device

## 2022-06-20 NOTE — H&P PST ADULT - PROBLEM SELECTOR PLAN 1
Patient tentatively scheduled for  right partial mastectomy with seed localization , sentinel lymph node biopsy on 06/28/2022.  Pre-op instructions provided. Pt given verbal and written instructions with teach back on chlorhexidine wash and pepcid. Pt verbalized understanding with return demonstration.     Pt strongly advised  for COVID testing requirements to be done  3- 5 days prior to procedure.  Pt was provided with information for covid testing locations in written form.   Pt verbalized understanding with return demonstration    Urine cup provided for day of procedure pregnancy test.

## 2022-06-23 PROBLEM — E66.9 OBESITY, UNSPECIFIED: Chronic | Status: ACTIVE | Noted: 2022-06-20

## 2022-06-23 PROBLEM — C50.311 MALIGNANT NEOPLASM OF LOWER-INNER QUADRANT OF RIGHT FEMALE BREAST: Chronic | Status: ACTIVE | Noted: 2022-06-20

## 2022-06-24 ENCOUNTER — LABORATORY RESULT (OUTPATIENT)
Age: 52
End: 2022-06-24

## 2022-06-27 NOTE — ASU PATIENT PROFILE, ADULT - FALL HARM RISK - UNIVERSAL INTERVENTIONS
Bed in lowest position, wheels locked, appropriate side rails in place/Call bell, personal items and telephone in reach/Instruct patient to call for assistance before getting out of bed or chair/Non-slip footwear when patient is out of bed/[place] to call system/Physically safe environment - no spills, clutter or unnecessary equipment/Purposeful Proactive Rounding/Room/bathroom lighting operational, light cord in reach Patient/Caregiver provided printed discharge information.

## 2022-06-28 ENCOUNTER — OUTPATIENT (OUTPATIENT)
Dept: OUTPATIENT SERVICES | Facility: HOSPITAL | Age: 52
LOS: 1 days | Discharge: HOME | End: 2022-06-28
Payer: COMMERCIAL

## 2022-06-28 ENCOUNTER — RESULT REVIEW (OUTPATIENT)
Age: 52
End: 2022-06-28

## 2022-06-28 ENCOUNTER — TRANSCRIPTION ENCOUNTER (OUTPATIENT)
Age: 52
End: 2022-06-28

## 2022-06-28 ENCOUNTER — NON-APPOINTMENT (OUTPATIENT)
Age: 52
End: 2022-06-28

## 2022-06-28 ENCOUNTER — APPOINTMENT (OUTPATIENT)
Dept: SURGERY | Facility: HOSPITAL | Age: 52
End: 2022-06-28

## 2022-06-28 ENCOUNTER — APPOINTMENT (OUTPATIENT)
Dept: NUCLEAR MEDICINE | Facility: HOSPITAL | Age: 52
End: 2022-06-28

## 2022-06-28 VITALS
HEART RATE: 70 BPM | OXYGEN SATURATION: 98 % | DIASTOLIC BLOOD PRESSURE: 66 MMHG | SYSTOLIC BLOOD PRESSURE: 116 MMHG | TEMPERATURE: 98 F | RESPIRATION RATE: 18 BRPM

## 2022-06-28 VITALS
SYSTOLIC BLOOD PRESSURE: 108 MMHG | RESPIRATION RATE: 18 BRPM | OXYGEN SATURATION: 99 % | DIASTOLIC BLOOD PRESSURE: 81 MMHG | HEART RATE: 66 BPM

## 2022-06-28 DIAGNOSIS — Z98.890 OTHER SPECIFIED POSTPROCEDURAL STATES: Chronic | ICD-10-CM

## 2022-06-28 DIAGNOSIS — C50.311 MALIGNANT NEOPLASM OF LOWER-INNER QUADRANT OF RIGHT FEMALE BREAST: ICD-10-CM

## 2022-06-28 DIAGNOSIS — Z90.49 ACQUIRED ABSENCE OF OTHER SPECIFIED PARTS OF DIGESTIVE TRACT: Chronic | ICD-10-CM

## 2022-06-28 LAB — HCG UR QL: NEGATIVE — SIGNIFICANT CHANGE UP

## 2022-06-28 PROCEDURE — 88307 TISSUE EXAM BY PATHOLOGIST: CPT | Mod: 26

## 2022-06-28 PROCEDURE — 88305 TISSUE EXAM BY PATHOLOGIST: CPT | Mod: 26

## 2022-06-28 PROCEDURE — 76098 X-RAY EXAM SURGICAL SPECIMEN: CPT | Mod: 26

## 2022-06-28 PROCEDURE — 19301K: CUSTOM | Mod: RT

## 2022-06-28 PROCEDURE — 38525K: CUSTOM | Mod: RT

## 2022-06-28 PROCEDURE — 38792K: CUSTOM

## 2022-06-28 RX ORDER — SEMAGLUTIDE 0.68 MG/ML
0 INJECTION, SOLUTION SUBCUTANEOUS
Qty: 0 | Refills: 0 | DISCHARGE

## 2022-06-28 RX ORDER — SODIUM CHLORIDE 9 MG/ML
1000 INJECTION, SOLUTION INTRAVENOUS
Refills: 0 | Status: DISCONTINUED | OUTPATIENT
Start: 2022-06-28 | End: 2022-07-12

## 2022-06-28 RX ORDER — CHOLECALCIFEROL (VITAMIN D3) 125 MCG
1 CAPSULE ORAL
Qty: 0 | Refills: 0 | DISCHARGE

## 2022-06-28 NOTE — BRIEF OPERATIVE NOTE - SPECIMENS
Right breast tissue. Right axillary lymph node x 2, Superior, medial, inferior, lateral breast margin.

## 2022-06-28 NOTE — BRIEF OPERATIVE NOTE - OPERATION/FINDINGS
Right breast lumpectomy using seed localization.     Right axillary sentinel lymph node biopsy with Gamma Probe.     Intra operative radiological confirmation of clip + seed within breast specimen.     SMIL Margins.

## 2022-06-28 NOTE — ASU DISCHARGE PLAN (ADULT/PEDIATRIC) - PATIENT EDUCATION MATERIALS PROVIED
recovery information sheet/Provider pre-printed instructions given/Pre-printed instructions given for crutch/cane training/Other (specify)

## 2022-06-28 NOTE — ASU DISCHARGE PLAN (ADULT/PEDIATRIC) - CARE PROVIDER_API CALL
Kavitha Emmanuel)  [facility]  [address], [address]  [place], [place] [id number]  Phone: [phone]  Fax: [phone]  Follow Up Time: 2 weeks

## 2022-06-28 NOTE — ASU DISCHARGE PLAN (ADULT/PEDIATRIC) - NURSING INSTRUCTIONS
AMS
DO NOT take any Tylenol (Acetaminophen) or narcotics containing Tylenol until after  3:30 pm______ . You received Tylenol during your operation and it can cause damage to your liver if too much is taken within a 24 hour time period.   No creams, lotions, ointments to incision site unless directed by surgeon. Do not scrub or rub incision while showering. Gently pat dry after shower.

## 2022-06-28 NOTE — BRIEF OPERATIVE NOTE - NSICDXBRIEFPROCEDURE_GEN_ALL_CORE_FT
PROCEDURES:  Lumpectomy, breast, with sentinel node biopsy 28-Jun-2022 10:13:46  Demarcus Crawford

## 2022-06-28 NOTE — ASU DISCHARGE PLAN (ADULT/PEDIATRIC) - PROCEDURE
Right Breast Lumpectomy, Right Axillary [place] Lymph Node Biopsy Right Breast Lumpectomy, Right Axillary [place] Lymph Node Biopsy

## 2022-06-28 NOTE — ASU DISCHARGE PLAN (ADULT/PEDIATRIC) - FOLLOW UP APPOINTMENTS
[facility] may also call Recovery Room (PACU) 24/7 @ [phone]/[facility]

## 2022-06-28 NOTE — ASU DISCHARGE PLAN (ADULT/PEDIATRIC) - ASU DC SPECIAL INSTRUCTIONSFT
PAIN CONTROL: Please take tylenol for pain control.    WOUND CARE:  Please keep incisions clean and dry. Please do not Scrub or rub incisions. Do not use lotion or powder on incisions. You have steri strip over your incision. Please do not remove these, they will fall off on their own.    BATHING: You may shower and/or sponge bathe starting tomorrow. Please do not submerge wound underwater. You may use warm soapy water in the shower and rinse, pat dry.    ACTIVITY: No heavy lifting or straining. Otherwise, you may return to your usual level of physical activity. If you are taking narcotic pain medication DO NOT drive a car, operate machinery or make important decisions.    DIET: Return to your usual diet.    NOTIFY YOUR SURGEON IF YOU HAVE: any bleeding that does not stop, any pus draining from your wound(s), any fever (over 100.4 F) persistent nausea/vomiting, or if your pain is not controlled on your discharge pain medications, unable to urinate.    Please follow up with your primary care physician in one week regarding your hospitalization, bring copies of your discharge paperwork.    Please follow-up with your surgeon, within 1-2 weeks following discharge- please call to schedule an appointment.

## 2022-06-28 NOTE — ASU DISCHARGE PLAN (ADULT/PEDIATRIC) - NS MD DC FALL RISK RISK
For information on Fall & Injury Prevention, visit: [email or web address] OR  [email or web address] OR  https://www.cdc.gov/steadi/patient.html

## 2022-06-29 LAB — SURGICAL PATHOLOGY STUDY: SIGNIFICANT CHANGE UP

## 2022-07-06 ENCOUNTER — NON-APPOINTMENT (OUTPATIENT)
Age: 52
End: 2022-07-06

## 2022-07-06 ENCOUNTER — APPOINTMENT (OUTPATIENT)
Dept: SURGERY | Facility: CLINIC | Age: 52
End: 2022-07-06

## 2022-07-06 PROCEDURE — 99024 POSTOP FOLLOW-UP VISIT: CPT

## 2022-07-07 ENCOUNTER — NON-APPOINTMENT (OUTPATIENT)
Age: 52
End: 2022-07-07

## 2022-07-08 ENCOUNTER — OUTPATIENT (OUTPATIENT)
Dept: OUTPATIENT SERVICES | Facility: HOSPITAL | Age: 52
LOS: 1 days | Discharge: HOME | End: 2022-07-08

## 2022-07-08 ENCOUNTER — TRANSCRIPTION ENCOUNTER (OUTPATIENT)
Age: 52
End: 2022-07-08

## 2022-07-08 ENCOUNTER — LABORATORY RESULT (OUTPATIENT)
Age: 52
End: 2022-07-08

## 2022-07-08 DIAGNOSIS — Z98.890 OTHER SPECIFIED POSTPROCEDURAL STATES: Chronic | ICD-10-CM

## 2022-07-08 DIAGNOSIS — C50.919 MALIGNANT NEOPLASM OF UNSPECIFIED SITE OF UNSPECIFIED FEMALE BREAST: ICD-10-CM

## 2022-07-08 DIAGNOSIS — Z90.49 ACQUIRED ABSENCE OF OTHER SPECIFIED PARTS OF DIGESTIVE TRACT: Chronic | ICD-10-CM

## 2022-07-12 ENCOUNTER — OUTPATIENT (OUTPATIENT)
Dept: OUTPATIENT SERVICES | Facility: HOSPITAL | Age: 52
LOS: 1 days | Discharge: HOME | End: 2022-07-12

## 2022-07-12 ENCOUNTER — RESULT REVIEW (OUTPATIENT)
Age: 52
End: 2022-07-12

## 2022-07-12 ENCOUNTER — APPOINTMENT (OUTPATIENT)
Dept: GASTROENTEROLOGY | Facility: HOSPITAL | Age: 52
End: 2022-07-12

## 2022-07-12 VITALS
TEMPERATURE: 97 F | HEIGHT: 62 IN | OXYGEN SATURATION: 98 % | WEIGHT: 190.04 LBS | DIASTOLIC BLOOD PRESSURE: 66 MMHG | RESPIRATION RATE: 16 BRPM | HEART RATE: 80 BPM | SYSTOLIC BLOOD PRESSURE: 116 MMHG

## 2022-07-12 VITALS
OXYGEN SATURATION: 97 % | SYSTOLIC BLOOD PRESSURE: 120 MMHG | RESPIRATION RATE: 14 BRPM | HEART RATE: 71 BPM | DIASTOLIC BLOOD PRESSURE: 81 MMHG

## 2022-07-12 DIAGNOSIS — Z00.00 ENCOUNTER FOR GENERAL ADULT MEDICAL EXAMINATION WITHOUT ABNORMAL FINDINGS: ICD-10-CM

## 2022-07-12 DIAGNOSIS — Z98.890 OTHER SPECIFIED POSTPROCEDURAL STATES: Chronic | ICD-10-CM

## 2022-07-12 DIAGNOSIS — Z90.49 ACQUIRED ABSENCE OF OTHER SPECIFIED PARTS OF DIGESTIVE TRACT: Chronic | ICD-10-CM

## 2022-07-12 LAB — HCG UR QL: NEGATIVE — SIGNIFICANT CHANGE UP

## 2022-07-12 PROCEDURE — 88305 TISSUE EXAM BY PATHOLOGIST: CPT | Mod: 26

## 2022-07-12 PROCEDURE — 45380 COLONOSCOPY AND BIOPSY: CPT

## 2022-07-12 DEVICE — CLIP RESOLUTION 360 235CM: Type: IMPLANTABLE DEVICE | Status: FUNCTIONAL

## 2022-07-12 RX ORDER — SODIUM CHLORIDE 9 MG/ML
500 INJECTION, SOLUTION INTRAVENOUS
Refills: 0 | Status: DISCONTINUED | OUTPATIENT
Start: 2022-07-12 | End: 2022-07-26
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
## 2022-07-12 NOTE — ASU PREOP CHECKLIST - INTERNAL PROSTHESES
Patient discharge criteria met. IV removed per order with catheter intact.  Patient voided per protocol.  Pain well controlled, VSS.  Patient given discharge instructions, verbalized understanding and able to teach back.  No complications noted.    
R shoulder

## 2022-07-12 NOTE — ASU PATIENT PROFILE, ADULT - NSICDXPASTMEDICALHX_GEN_ALL_CORE_FT
PAST MEDICAL HISTORY:  Malignant neoplasm of lower-inner quadrant of right female breast     Obesity on ozempic

## 2022-07-13 ENCOUNTER — APPOINTMENT (OUTPATIENT)
Dept: HEMATOLOGY ONCOLOGY | Facility: CLINIC | Age: 52
End: 2022-07-13

## 2022-07-13 PROCEDURE — 99205 OFFICE O/P NEW HI 60 MIN: CPT

## 2022-07-14 ENCOUNTER — OUTPATIENT (OUTPATIENT)
Dept: OUTPATIENT SERVICES | Facility: HOSPITAL | Age: 52
LOS: 1 days | Discharge: HOME | End: 2022-07-14

## 2022-07-14 ENCOUNTER — APPOINTMENT (OUTPATIENT)
Dept: BARIATRICS/WEIGHT MGMT | Facility: CLINIC | Age: 52
End: 2022-07-14

## 2022-07-14 ENCOUNTER — APPOINTMENT (OUTPATIENT)
Dept: RADIATION ONCOLOGY | Facility: CLINIC | Age: 52
End: 2022-07-14

## 2022-07-14 VITALS
BODY MASS INDEX: 36.5 KG/M2 | WEIGHT: 198.38 LBS | TEMPERATURE: 97.16 F | HEIGHT: 62 IN | DIASTOLIC BLOOD PRESSURE: 85 MMHG | SYSTOLIC BLOOD PRESSURE: 136 MMHG | HEART RATE: 79 BPM | RESPIRATION RATE: 18 BRPM | OXYGEN SATURATION: 99 %

## 2022-07-14 VITALS — BODY MASS INDEX: 35.3 KG/M2 | WEIGHT: 193 LBS

## 2022-07-14 DIAGNOSIS — Z90.49 ACQUIRED ABSENCE OF OTHER SPECIFIED PARTS OF DIGESTIVE TRACT: Chronic | ICD-10-CM

## 2022-07-14 DIAGNOSIS — Z98.890 OTHER SPECIFIED POSTPROCEDURAL STATES: Chronic | ICD-10-CM

## 2022-07-14 PROCEDURE — 99204 OFFICE O/P NEW MOD 45 MIN: CPT | Mod: 25

## 2022-07-14 PROCEDURE — 99214 OFFICE O/P EST MOD 30 MIN: CPT | Mod: 95

## 2022-07-14 PROCEDURE — 77263 THER RADIOLOGY TX PLNG CPLX: CPT

## 2022-07-14 NOTE — HISTORY OF PRESENT ILLNESS
[Disease: _____________________] : Disease: [unfilled] [T: ___] : T[unfilled] [N: ___] : N[unfilled] [M: ___] : M[unfilled] [AJCC Stage: ____] : AJCC Stage: [unfilled] [de-identified] : Please see dictated initial consult note (*office visit) in [facility] [de-identified] : ER+ IA+ her 2 mikey -

## 2022-07-14 NOTE — HISTORY OF PRESENT ILLNESS
[FreeTextEntry1] : Bariatric surgery history: none\par Obesity co-morbidities: HLD, vit d def, GERD\par Comorbidities improved or resolved: none\par Anti-obesity medications: Ozempic\par Obesity medication side effects: none\par \par Ms. LUIS SIEGEL is a 51 year year old female who presents for evaluation and treatment of Class 3 obesity, now Class 2 obesity. \par +Does have an IUD unsure of menopause status\par \par Obesity related co- morbidities: Vit D deficiency, mild GERD - only OTCs\par \par Patient lives -  and kids - 2 boys. 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 \par \par Weight History:\par Lowest adult weight: 130\par Highest adult weight: 215\par \par Interim\par - been on Ozempic 1mg.  decr appetite, eating healthier inclu going out to dinner. \par - surgery on June 29th - had lumpectomy.  wlil be going for radiation\par - has been eating bkfast, lunch and dinner.  motivated to get out the bikes and go out for more walks w \par - wants to restart bike rides on the weekends.  In the summer, hoping to go to hiking trip. \par - having snacks on hand - > fruits, nuts\par - bkfast around 10am, also making it a point to have lunch @ work.  \par - dinner - eating before 8pm\par -  started walking with her more often, took out the bikes last weekend.  gardening.  \par \par Has gained 0-5 pounds over the past year.\par \par Obesity began in 20s  Weight gain has occurred with: more sedentary.  First baby weight came off pretty quickly.  nd baby in 2005.  Went up to 200, down to 180s.  In her early 40s, changed diet and got rid of sugar and lost about 35 lbs to 170.  Maintained about a year then gradually increased. \par \par Past weight loss attempts include: WW.  These have produced a maximum of 35 pounds of weight loss.  \par Anti-obesity medications in the past: No. Way back in 20s, herbal pills.  Open to it. \par \par Reasons for desiring weight loss:  not get to winded up the stairs.  wants to be more active. wanting to get in a shape to hike more. likes the outdoors.  Summer.  \par Perceived obstacles to losing weight: sugary stuff\par \par Sleep: 7-8 hours. has gotten better in the last few months.   notices that she tends to breath heavier. \par +has been meditating.  \par \par Has 1 regular meals a day.  Dinner only.  Now - > 3 meals a day. \par \par Diet history:\par wakes up at: 6 am \par B: cup of coffee while she goes to bed\par L: "doesn't have time"\par snacks -  2 pm - yogurt smoothies, nuts, cheese sticks\par at home - really hungry when she didn't bring snacks - chips/crackers/ turkey with cheese\par D: both cook 730-8 pm - rice with beans and a protein. mashed potatoes or vegetables, and protein. stays away from red meat. lots of chicken, fish and shrimp as well.  pork sometimes. \par \par Sleep\par bedtime - 10 30pm\par Sleeping better now.  \par \par On weekends has breakfast - egg scramble w peppers onions tomatoes.  sometimes lunch, usually eats out on weekends - will share appetizer, regular meal.  +italian restaurants is one of her favorites. \par \par snacks: yes\par eating after dinner: no\par overeating episodes: sometimes.  maybe 2-3 times a week. \par \par Sodas/fast food/processed foods: take out once a week. varies. +fried, chips\par \par Water intake per day: 4-5 x8 oz cups per day\par 1 cup coffee per day - non dairy creamer.\par \par Physical activity:\par Patient enjoys: walking, biking\par Current physical activity: walking twice a week 30 minutes. \par Have weights, yoga mat. \par \par Habits patient would like to change: eating more regularly\par Level of interest in losing weight: 5/5\par Community support: 4/5\par \par Factors that have helped in the past with losing weight and keeping it off: eating more vegetables and eating consistently\par \par

## 2022-07-14 NOTE — REASON FOR VISIT
[Follow-Up Visit] : a follow-up visit for [Obesity] : obesity [Home] : at home, [unfilled] , at the time of the visit. [Medical Office: ([facility])___] : at the medical office located in

## 2022-07-14 NOTE — ASSESSMENT
[FreeTextEntry1] : 51 y.o female w Class 3 obesity now Class 2 obesity, elev crp, elev LDL, vit d def, presents for weight management\par \par - [facility]empic to [facility].  Increase to 1mg.  Has lost 22# since March, >10% body weight\par - doing well with weight loss. wants to slowly incr activity as tolerated with radiation and recovering from lumpectomy - enjoy rest of her summer\par - continue bkfast and lunch.  Microwave and 10 minutes.   \par - drink 2L water per day.\par - will need to increase physical activity as well, less of a priority at this moment, can work on later\par - see MARCE Kim\par \par f/u in 4 weeks

## 2022-07-17 NOTE — HISTORY OF PRESENT ILLNESS
[FreeTextEntry1] : Ms. Garcia is a 51 year old female with stage IA, [id number] ER+AZ+HER2- invasive ductal carcinoma of the right breast, s/p lumpectomy with negative margins and negative SLNB, presenting for consideration of adjuvant radiation.\par \par She had a prior history of normal mammograms dating from 2155-3648, before an abnormal screening mammogram 5/28/22 (BIRADS 0). This screening mammogram was notable for a focal asymmetry in the lower inner right breast and an asymmetry in the upper right breast seen in the MLO view. Diagnostic mammogram 6/2/22 was done revealing indeterminate calcifications in the right inferior outer posterior breast, for which stereotactic biopsy was recommended. \par \par Stereotactic biopsy of the right inferior outer posterior breast was performed 6/7/22, revealing invasive moderately differentiated ductal carcinoma Kayla Grade 6/9, with an invasive tumor component of at least 0.3 cm. High grade DCIS with microcalcifications was also appreciated in the sample. There was no lymphvascular invasion. Flat epithelial atypia noted in the uninvolved breast tissue. IHC results: ER 90% AZ 90% HER2 negative (1+). \par \par She subsequently underwent breast MRI 6/14/22 which was notable for a post-biopsy hematoma, in the right inferior outer posterior breast, as well as a linear branching non-mass enhancement in the upper inner left breast, for which MRI guided core biopsy advised. MRI guided inner biopsy 6/20/22 was unremarkable for any evidence of malignancy, revealing tiny intraductal papilloma, fibrocystic changes with florid duct hyperplasia and focal calcifications, and focal stromal elastosis.\par \par She underwent right lumpectomy with sentinel node biopsy on 6/28/22. The pathology showed no residual disease. Margins were negative, including additional shaved margins. Four right axillary sentinel nodes were negative for tumor. \par \par She is recovering well from surgery, with mild residual pain that is improving.  She takes an anti-inflammatory as needed. She met with Dr. Nazario yesterday and plans for endocrine therapy after RT.

## 2022-07-17 NOTE — PHYSICAL EXAM
[] : no respiratory distress [Exaggerated Use Of Accessory Muscles For Inspiration] : no accessory muscle use [Heart Rate And Rhythm] : heart rate and rhythm were normal [No UE Edema] : there is no upper extremity edema [Abdomen Soft] : soft [Nondistended] : nondistended [Supraclavicular Lymph Nodes Enlarged Bilaterally] : supraclavicular [Axillary Lymph Nodes Enlarged Bilaterally] : axillary [Normal] : oriented to person, place and time, the affect was normal, the mood was normal and not anxious [de-identified] : Right lumpectomy and axillary scars are clean and dry, no erythema

## 2022-07-17 NOTE — LETTER CLOSING
[Consult Closing:] : Thank you for allowing me to participate in the care of this patient.  If you have any questions, please do not hesitate to contact me. [Sincerely yours,] : Sincerely yours, [FreeTextEntry3] : Mala Phillips MD\par

## 2022-07-17 NOTE — VITALS
[Maximal Pain Intensity: 6/10] : 6/10 [Least Pain Intensity: 0/10] : 0/10 [Pain Location: ___] : Pain Location: [unfilled] [Date: ____________] : Patient's last distress assessment performed on [unfilled]. [7 - Distress Level] : Distress Level: 7 [Referred Patient  to social work for follow-up] : Patient was referred to social work for follow-up [90: Able to carry normal activity; minor signs or symptoms of disease.] : 90: Able to carry normal activity; minor signs or symptoms of disease.  [ECOG Performance Status: 0 - Fully active, able to carry on all pre-disease performance without restriction] : Performance Status: 0 - Fully active, able to carry on all pre-disease performance without restriction

## 2022-07-18 ENCOUNTER — NON-APPOINTMENT (OUTPATIENT)
Age: 52
End: 2022-07-18

## 2022-07-18 PROCEDURE — 77290 THER RAD SIMULAJ FIELD CPLX: CPT | Mod: 26

## 2022-07-18 PROCEDURE — 77334 RADIATION TREATMENT AID(S): CPT | Mod: 26

## 2022-07-19 ENCOUNTER — NON-APPOINTMENT (OUTPATIENT)
Age: 52
End: 2022-07-19

## 2022-07-20 PROCEDURE — 77300 RADIATION THERAPY DOSE PLAN: CPT | Mod: 26

## 2022-07-20 PROCEDURE — 77334 RADIATION TREATMENT AID(S): CPT | Mod: 26

## 2022-07-20 PROCEDURE — 77295 3-D RADIOTHERAPY PLAN: CPT | Mod: 26

## 2022-07-22 ENCOUNTER — APPOINTMENT (OUTPATIENT)
Dept: INTERNAL MEDICINE | Facility: CLINIC | Age: 52
End: 2022-07-22

## 2022-07-22 LAB — SURGICAL PATHOLOGY STUDY: SIGNIFICANT CHANGE UP

## 2022-07-22 PROCEDURE — 99213 OFFICE O/P EST LOW 20 MIN: CPT | Mod: 95

## 2022-07-23 NOTE — ASSESSMENT
[FreeTextEntry1] : 50 yo female with h/o as above here for telehealth f/up visit with newly diagnosed breast cancer right breast s/p lumpectomy and awaiting radiation, following with medical oncology and radiation oncology, offered behavioral health resources if interested.  C/w lifestyle changes for weight loss.  RTO for cpe when due, sooner if needed.

## 2022-07-23 NOTE — PHYSICAL EXAM
[No Acute Distress] : no acute distress [Well Nourished] : well nourished [Well Developed] : well developed [Well-Appearing] : well-appearing [Supple] : supple [No Respiratory Distress] : no respiratory distress  [No Accessory Muscle Use] : no accessory muscle use [No Rash] : no rash [Normal Affect] : the affect was normal

## 2022-07-23 NOTE — HISTORY OF PRESENT ILLNESS
[Other Location: e.g. School (Enter Location, City,State)___] : at [unfilled], at the time of the visit. [Medical Office: ([facility])___] : at the medical office located in  [Verbal consent obtained from patient] : the patient, [unfilled] [de-identified] : 52 yo female with h/o as below here for telehealth visit to f/up and update writer on conditions.\par Went for annual mammogram in May, found something abnl, had biopsy, found DCIS?, had lumpectomy, awaiting radiation 16 sessions and then will decide if going on tamoxifen for 5 years, just met with oncologist.  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  \par Did colonoscopy last week, still waiting on few results, found ulcers in terminal ileum and waiting on biopsies, no polyps found, pending path results may need repeat colonoscopy in 6-12 months, staying away from NSAIDS.\par Lost 25 lbs with weight management, will f/up with them.\par No other active issues.

## 2022-07-25 ENCOUNTER — NON-APPOINTMENT (OUTPATIENT)
Age: 52
End: 2022-07-25

## 2022-07-26 PROCEDURE — 77280 THER RAD SIMULAJ FIELD SMPL: CPT | Mod: 26

## 2022-07-27 ENCOUNTER — NON-APPOINTMENT (OUTPATIENT)
Age: 52
End: 2022-07-27

## 2022-08-02 PROCEDURE — 77427 RADIATION TX MANAGEMENT X5: CPT

## 2022-08-03 ENCOUNTER — RESULT REVIEW (OUTPATIENT)
Age: 52
End: 2022-08-03

## 2022-08-03 ENCOUNTER — NON-APPOINTMENT (OUTPATIENT)
Age: 52
End: 2022-08-03

## 2022-08-03 NOTE — PHYSICAL EXAM
[Normal] : well developed, well nourished, in no acute distress [de-identified] : Right lumpectomy and axillary scars are well healed, no erythema, no hyperpigmentation.

## 2022-08-03 NOTE — DISEASE MANAGEMENT
[Pathological] : TNM Stage: p [IA] : IA [TTNM] : 1a [NTNM] : 0 [MTNM] : 0 [de-identified] : 265 cGy [de-identified] : 7117 cGy [de-identified] : Right breast

## 2022-08-04 ENCOUNTER — APPOINTMENT (OUTPATIENT)
Dept: HEMATOLOGY ONCOLOGY | Facility: CLINIC | Age: 52
End: 2022-08-04

## 2022-08-04 PROCEDURE — 99213 OFFICE O/P EST LOW 20 MIN: CPT

## 2022-08-04 NOTE — HISTORY OF PRESENT ILLNESS
[de-identified] : Seen in f/u.\par \par Pt is getting adjuvant XRT - to complete in 1-2 weeks. She notes mild related fatigue.\par \par She otherwise denies all other complaints noting a a good appetite stable wt and excellent performance status.\par \par Pt is seen today to review her dx, its implications, treatment options and risks / benefits to make her decision.

## 2022-08-05 NOTE — PHYSICAL EXAM
[Normal] : well developed, well nourished, in no acute distress [de-identified] : Right lumpectomy and axillary scars are well healed, mild erythema and hyperpigmentation.

## 2022-08-05 NOTE — HISTORY OF PRESENT ILLNESS
[FreeTextEntry1] : Ms. Garcia is a 51 year old female with stage IA, T1aN0(sn)M0 hormone receptor positive, HER2 negative, moderately differentiated invasive ductal carcinoma of the right breast. She underwent lumpectomy with negative margins and had a negative SLNB. She is currently receiving adjuvant radiation therapy. \par \par She is feeling generally well. She does report an increase in fatigue, and intermittent fleeting pains in the right breast with some swelling.

## 2022-08-05 NOTE — VITALS
[ECOG Performance Status: 0 - Fully active, able to carry on all pre-disease performance without restriction] : Performance Status: 0 - Fully active, able to carry on all pre-disease performance without restriction [Pain Description/Quality: ___] : Pain description/quality: [unfilled] [Pain Duration: ___] : Pain duration: [unfilled] [Pain Location: ___] : Pain Location: [unfilled] [NoTreatment Scheduled] : no treatment scheduled [Maximal Pain Intensity: 3/10] : 3/10 [Least Pain Intensity: 0/10] : 0/10 [Pain Interferes with ADLs] : Pain does not interfere with activities of daily living [80: Normal activity with effort; some signs or symptoms of disease.] : 80: Normal activity with effort; some signs or symptoms of disease.

## 2022-08-05 NOTE — DISEASE MANAGEMENT
[Pathological] : TNM Stage: p [IA] : IA [TTNM] : 1a [NTNM] : 0 [MTNM] : 0 [de-identified] : 1590 cGy [de-identified] : 3983 cGy [de-identified] : Right breast

## 2022-08-09 PROCEDURE — 77427 RADIATION TX MANAGEMENT X5: CPT

## 2022-08-10 ENCOUNTER — NON-APPOINTMENT (OUTPATIENT)
Age: 52
End: 2022-08-10

## 2022-08-12 ENCOUNTER — RX RENEWAL (OUTPATIENT)
Age: 52
End: 2022-08-12

## 2022-08-16 PROCEDURE — 77427 RADIATION TX MANAGEMENT X5: CPT

## 2022-08-16 NOTE — HISTORY OF PRESENT ILLNESS
[FreeTextEntry1] : Ms. Garcia is a 51 year old female with stage IA, T1aN0(sn)M0 hormone receptor positive, HER2 negative, moderately differentiated invasive ductal carcinoma of the right breast. She underwent lumpectomy with negative margins and had a negative SLNB. She is currently receiving adjuvant radiation therapy. \par \par She is feeling generally well. She does report an increase in fatigue with more sleeping/napping and intermittent fleeting pains in the right breast with some swelling. She is using Calendula on her skin.

## 2022-08-16 NOTE — PHYSICAL EXAM
[Normal] : well developed, well nourished, in no acute distress [de-identified] : Right lumpectomy and axillary scars are well healed, mild erythema and hyperpigmentation.

## 2022-08-16 NOTE — DISEASE MANAGEMENT
[Pathological] : TNM Stage: p [IA] : IA [TTNM] : 1a [NTNM] : 0 [MTNM] : 0 [de-identified] : 8635 cGy [de-identified] : 6050 cGy [de-identified] : Right breast

## 2022-08-16 NOTE — VITALS
[Maximal Pain Intensity: 3/10] : 3/10 [Least Pain Intensity: 0/10] : 0/10 [Pain Description/Quality: ___] : Pain description/quality: [unfilled] [Pain Duration: ___] : Pain duration: [unfilled] [Pain Location: ___] : Pain Location: [unfilled] [NoTreatment Scheduled] : no treatment scheduled [80: Normal activity with effort; some signs or symptoms of disease.] : 80: Normal activity with effort; some signs or symptoms of disease.  [ECOG Performance Status: 0 - Fully active, able to carry on all pre-disease performance without restriction] : Performance Status: 0 - Fully active, able to carry on all pre-disease performance without restriction [Pain Interferes with ADLs] : Pain does not interfere with activities of daily living

## 2022-08-17 ENCOUNTER — NON-APPOINTMENT (OUTPATIENT)
Age: 52
End: 2022-08-17

## 2022-08-18 ENCOUNTER — APPOINTMENT (OUTPATIENT)
Dept: BARIATRICS/WEIGHT MGMT | Facility: CLINIC | Age: 52
End: 2022-08-18

## 2022-08-18 VITALS — BODY MASS INDEX: 34.2 KG/M2 | WEIGHT: 187 LBS

## 2022-08-18 PROCEDURE — 99214 OFFICE O/P EST MOD 30 MIN: CPT | Mod: 95

## 2022-08-18 NOTE — REASON FOR VISIT
[Follow-Up Visit] : a follow-up visit for [Obesity] : obesity [Home] : at home, [unfilled] , at the time of the visit. [Medical Office: ([facility])___] : at the medical office located in

## 2022-08-18 NOTE — HISTORY OF PRESENT ILLNESS
[FreeTextEntry1] : Bariatric surgery history: none\par Obesity co-morbidities: HLD, vit d def, GERD\par Comorbidities improved or resolved: none\par Anti-obesity medications: Ozempic\par Obesity medication side effects: none\par \par Ms. LUIS SIEGEL is a 51 year year old female who presents for evaluation and treatment of Class 3 obesity, now Class 2 obesity. \par +Does have an IUD unsure of menopause status\par \par Obesity related co- morbidities: Vit D deficiency, mild GERD - only OTCs\par \par Patient lives -  and kids - 2 boys. 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 \par \par Weight History:\par Lowest adult weight: 130\par Highest adult weight: 215\par \par Interim:\par - been on Ozempic 1mg.  decr appetite, eating healthier inclu going out to dinner. \par - surgery on June 29th - done with radiation.  Going to start tamoxifen in 2 weeks or so. \par - has been eating bkfast, lunch and dinner.  motivated to get out the bikes and go out for more walks w \par - wants to restart bike rides on the weekends.  In the summer, hoping to go to hiking trip. \par - having snacks on hand - > fruits, nuts\par - bkfast around 10am, also making it a point to have lunch @ work.  \par - dinner - eating before 8pm\par -  started walking with her more often, took out the bikes last weekend.  gardening.  \par \par Has gained 0-5 pounds over the past year.\par \par Obesity began in 20s  Weight gain has occurred with: more sedentary.  First baby weight came off pretty quickly.  nd baby in 2005.  Went up to 200, down to 180s.  In her early 40s, changed diet and got rid of sugar and lost about 35 lbs to 170.  Maintained about a year then gradually increased. \par \par Past weight loss attempts include: WW.  These have produced a maximum of 35 pounds of weight loss.  \par Anti-obesity medications in the past: No. Way back in 20s, herbal pills.  Open to it. \par \par Reasons for desiring weight loss:  not get to winded up the stairs.  wants to be more active. wanting to get in a shape to hike more. likes the outdoors.  Summer.  \par Perceived obstacles to losing weight: sugary stuff\par \par Sleep: 7-8 hours. has gotten better in the last few months.   notices that she tends to breath heavier. \par +has been meditating.  \par \par Has 1 regular meals a day.  Dinner only.  Now - > 3 meals a day. \par \par Diet history:\par wakes up at: 6 am \par B: cup of coffee while she goes to bed\par L: "doesn't have time"\par snacks -  2 pm - yogurt smoothies, nuts, cheese sticks\par at home - really hungry when she didn't bring snacks - chips/crackers/ turkey with cheese\par D: both cook 730-8 pm - rice with beans and a protein. mashed potatoes or vegetables, and protein. stays away from red meat. lots of chicken, fish and shrimp as well.  pork sometimes. \par \par Sleep\par bedtime - 10 30pm\par Sleeping better now.  \par \par On weekends has breakfast - egg scramble w peppers onions tomatoes.  sometimes lunch, usually eats out on weekends - will share appetizer, regular meal.  +italian restaurants is one of her favorites. \par \par snacks: yes\par eating after dinner: no\par overeating episodes: sometimes.  maybe 2-3 times a week. \par \par Sodas/fast food/processed foods: take out once a week. varies. +fried, chips\par \par Water intake per day: 4-5 x8 oz cups per day\par 1 cup coffee per day - non dairy creamer.\par \par Physical activity:\par Patient enjoys: walking, biking\par Current physical activity: walking twice a week 30 minutes. \par Have weights, yoga mat. \par \par Habits patient would like to change: eating more regularly\par Level of interest in losing weight: 5/5\par Community support: 4/5\par \par Factors that have helped in the past with losing weight and keeping it off: eating more vegetables and eating consistently\par \par

## 2022-08-19 NOTE — PHYSICAL EXAM
[Normal] : well developed, well nourished, in no acute distress [de-identified] : Right lumpectomy and axillary scars are well healed, mild erythema and hyperpigmentation, scattered rash, no desquamation

## 2022-08-19 NOTE — HISTORY OF PRESENT ILLNESS
[FreeTextEntry1] : Ms. Garcia is a 51 year old female with stage IA, T1aN0(sn)M0 hormone receptor positive, HER2 negative, moderately differentiated invasive ductal carcinoma of the right breast. She underwent lumpectomy with negative margins and had a negative SLNB. She is currently receiving adjuvant radiation therapy. \par \par She is feeling generally well. She does report an increase in fatigue with more sleeping/napping and intermittent fleeting pains in the right breast with some swelling. She is using Calendula on her skin. Reports some itching.

## 2022-08-19 NOTE — DISEASE MANAGEMENT
[Pathological] : TNM Stage: p [IA] : IA [TTNM] : 1a [NTNM] : 0 [MTNM] : 0 [de-identified] : 1148 cGy [de-identified] : 7849 cGy [de-identified] : Right breast

## 2022-08-23 ENCOUNTER — APPOINTMENT (OUTPATIENT)
Dept: BARIATRICS/WEIGHT MGMT | Facility: CLINIC | Age: 52
End: 2022-08-23

## 2022-09-14 ENCOUNTER — NON-APPOINTMENT (OUTPATIENT)
Age: 52
End: 2022-09-14

## 2022-09-21 ENCOUNTER — APPOINTMENT (OUTPATIENT)
Dept: INFUSION THERAPY | Facility: HOSPITAL | Age: 52
End: 2022-09-21

## 2022-09-29 ENCOUNTER — APPOINTMENT (OUTPATIENT)
Dept: BARIATRICS/WEIGHT MGMT | Facility: CLINIC | Age: 52
End: 2022-09-29

## 2022-09-29 ENCOUNTER — APPOINTMENT (OUTPATIENT)
Dept: RADIATION ONCOLOGY | Facility: CLINIC | Age: 52
End: 2022-09-29

## 2022-09-29 VITALS
SYSTOLIC BLOOD PRESSURE: 130 MMHG | OXYGEN SATURATION: 99 % | HEIGHT: 62 IN | WEIGHT: 181 LBS | BODY MASS INDEX: 33.31 KG/M2 | RESPIRATION RATE: 18 BRPM | TEMPERATURE: 98.24 F | DIASTOLIC BLOOD PRESSURE: 86 MMHG | HEART RATE: 81 BPM

## 2022-09-29 VITALS — BODY MASS INDEX: 33.11 KG/M2 | WEIGHT: 181 LBS

## 2022-09-29 DIAGNOSIS — N91.2 AMENORRHEA, UNSPECIFIED: ICD-10-CM

## 2022-09-29 PROCEDURE — 99214 OFFICE O/P EST MOD 30 MIN: CPT | Mod: 95

## 2022-09-29 PROCEDURE — 99024 POSTOP FOLLOW-UP VISIT: CPT | Mod: GC

## 2022-09-29 RX ORDER — SODIUM SULFATE, MAGNESIUM SULFATE, AND POTASSIUM CHLORIDE 17.75; 2.7; 2.25 G/1; G/1; G/1
1479-225-188 TABLET ORAL
Qty: 24 | Refills: 0 | Status: DISCONTINUED | COMMUNITY
Start: 2022-04-25 | End: 2022-09-29

## 2022-09-29 NOTE — REASON FOR VISIT
[Follow-Up Visit] : a follow-up visit for [Obesity] : obesity [Home] : at home, [unfilled] , at the time of the visit. [Medical Office: ([facility])___] : at the medical office located in

## 2022-09-30 LAB — HCG SERPL-MCNC: <1 MIU/ML

## 2022-10-03 NOTE — ASSESSMENT
[FreeTextEntry1] : 51 y.o female w Class 3 obesity now Class 1 obesity, elev crp, elev LDL, vit d def, presents for weight management\par \par - Continue 1mg Ozempic. Has lost >20# since March, >10% body weight\par - doing well with weight loss. wants to slowly incr activity as tolerated with decr energy\par - continue bkfast and lunch.  Microwave and 10 minutes.   \par - drink 2L water per day.\par - will need to increase physical activity as well, less of a priority at this moment, can work on later\par - see MARCE Kim\par \par f/u in 4-5 weeks

## 2022-10-03 NOTE — HISTORY OF PRESENT ILLNESS
[FreeTextEntry1] : Bariatric surgery history: none\par Obesity co-morbidities: HLD, vit d def, GERD\par Comorbidities improved or resolved: none\par Anti-obesity medications: Ozempic\par Obesity medication side effects: none\par \par Ms. LUIS SIEGEL is a 51 year year old female who presents for evaluation and treatment of Class 3 obesity, now Class 2 obesity. \par +Does have an IUD unsure of menopause status\par \par Obesity related co- morbidities: Vit D deficiency, mild GERD - only OTCs\par \par Patient lives -  and kids - 2 boys. 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 \par \par Weight History:\par Lowest adult weight: 130\par Highest adult weight: 215\par \par Interim:\par - been on Ozempic 1mg.  decr appetite, eating healthier inclu going out to dinner.  about the same.  no snacking. \par - f/u with RadOnc in 6 months.\par - has been eating bkfast, lunch and dinner.  motivated to get out the bikes and go out for more walks w \par - having snacks on hand - > fruits, nuts\par - bkfast around 10am, also making it a point to have lunch @ work.  \par - dinner - eating before 8pm\par -  started walking with her more often\par \par Has gained 0-5 pounds over the past year.\par \par Obesity began in 20s  Weight gain has occurred with: more sedentary.  First baby weight came off pretty quickly.  nd baby in 2005.  Went up to 200, down to 180s.  In her early 40s, changed diet and got rid of sugar and lost about 35 lbs to 170.  Maintained about a year then gradually increased. \par \par Past weight loss attempts include: WW.  These have produced a maximum of 35 pounds of weight loss.  \par Anti-obesity medications in the past: No. Way back in 20s, herbal pills.  Open to it. \par \par Reasons for desiring weight loss:  not get to winded up the stairs.  wants to be more active. wanting to get in a shape to hike more. likes the outdoors.  Summer.  \par Perceived obstacles to losing weight: sugary stuff\par \par Sleep: 7-8 hours. has gotten better in the last few months.   notices that she tends to breath heavier. \par +has been meditating.  \par \par Has 1 regular meals a day.  Dinner only.  Now - > 3 meals a day. \par \par Diet history:\par wakes up at: 6 am \par B: cup of coffee while she goes to bed, breakfast - yogurt or overnight oats w fruit. \par L: "doesn't have time"-> these days has lunch every day.  leftovers.  \par snacks -  2 pm - yogurt smoothies, nuts, cheese sticks\par at home - really hungry when she didn't bring snacks - chips/crackers/ turkey with cheese -> no snacks these days\par D: both cook 730-8 pm - rice with beans and a protein. mashed potatoes or vegetables, and protein. stays away from red meat. lots of chicken, fish and shrimp as well.  pork sometimes. Lean protein, smaller amount of carbs, with veg.  +cauliflower rice. \par \par Sleep\par bedtime - 10 30pm\par Sleeping better now.  \par \par On weekends has breakfast - egg scramble w peppers onions tomatoes.  sometimes lunch, usually eats out on weekends - will share appetizer, regular meal.  +italian restaurants is one of her favorites. \par \par snacks: yes\par eating after dinner: no\par overeating episodes: sometimes.  maybe 2-3 times a week. \par \par Sodas/fast food/processed foods: take out once a week. varies. +fried, chips\par \par Water intake per day: 4-5 x8 oz cups per day\par 1 cup coffee per day - non dairy creamer.\par \par Physical activity:\par Patient enjoys: walking, biking\par Current physical activity: walking twice a week 30 minutes. \par Have weights, yoga mat. \par \par Habits patient would like to change: eating more regularly\par Level of interest in losing weight: 5/5\par Community support: 4/5\par \par Factors that have helped in the past with losing weight and keeping it off: eating more vegetables and eating consistently\par \par

## 2022-10-04 NOTE — REVIEW OF SYSTEMS
[Fatigue] : fatigue [Negative] : Heme/Lymph [Fatigue: Grade 1 - Fatigue relieved by rest] : Fatigue: Grade 1 - Fatigue relieved by rest [Breast Pain: Grade 1 - Mild pain] : Breast Pain: Grade 1 - Mild pain [Skin Hyperpigmentation: Grade 1 - Hyperpigmentation covering <10% BSA; no psychosocial impact] : Skin Hyperpigmentation: Grade 1 - Hyperpigmentation covering <10% BSA; no psychosocial impact [FreeTextEntry7] : poor appetite  [de-identified] : hyperpigmentation R axilla  [FreeTextEntry4] : R greta

## 2022-10-04 NOTE — PHYSICAL EXAM
[Extraocular Movements] : extraocular movements were intact [] : no respiratory distress [Sclera] : the sclera and conjunctiva were normal [No UE Edema] : there is no upper extremity edema [Normal] : no palpable adenopathy [Supraclavicular Lymph Nodes Enlarged Bilaterally] : supraclavicular [Axillary Lymph Nodes Enlarged Bilaterally] : axillary [de-identified] : Right lumpectomy and axillary scars are well healed, mild residual hyperpigmentation.

## 2022-10-04 NOTE — HISTORY OF PRESENT ILLNESS
[FreeTextEntry1] : Ms. Garcia is a 51 year old female with stage IA, T1aN0(sn)M0 hormone receptor positive, HER2 negative, moderately differentiated invasive ductal carcinoma of the right breast. She underwent lumpectomy with negative margins and had a negative SLNB. She completed a course of adjuvant radiation therapy to the right breast, a dose of 4,240 cGy from 7/27/22 - 8/17/22.\par \par She comes today for a post-treatment evaluation. The patient reports feeling generally improved but continues to experience fatigue. Despite feeling fatigued, she remains busy and active. She had cold symptoms in the interim and tested negative for COVID multiple times. She reports poor appetite, and some intentional weight loss. Denies dysphagia, cough, or shortness of breath. There has been some improvement in the skin since completing radiation therapy - moisturizing sparingly. Notes bruising following application of wired bra. Having intermittent piercing sensation across R breast. She is taking tamoxifen per Dr. Nazario and tolerating well.  \par

## 2022-10-10 ENCOUNTER — RX RENEWAL (OUTPATIENT)
Age: 52
End: 2022-10-10

## 2022-10-25 ENCOUNTER — APPOINTMENT (OUTPATIENT)
Dept: MRI IMAGING | Facility: CLINIC | Age: 52
End: 2022-10-25

## 2022-10-25 ENCOUNTER — OUTPATIENT (OUTPATIENT)
Dept: OUTPATIENT SERVICES | Facility: HOSPITAL | Age: 52
LOS: 1 days | End: 2022-10-25
Payer: COMMERCIAL

## 2022-10-25 DIAGNOSIS — Z00.8 ENCOUNTER FOR OTHER GENERAL EXAMINATION: ICD-10-CM

## 2022-10-25 DIAGNOSIS — K52.9 NONINFECTIVE GASTROENTERITIS AND COLITIS, UNSPECIFIED: ICD-10-CM

## 2022-10-25 DIAGNOSIS — Z98.890 OTHER SPECIFIED POSTPROCEDURAL STATES: Chronic | ICD-10-CM

## 2022-10-25 DIAGNOSIS — Z90.49 ACQUIRED ABSENCE OF OTHER SPECIFIED PARTS OF DIGESTIVE TRACT: Chronic | ICD-10-CM

## 2022-10-25 PROCEDURE — A9585: CPT

## 2022-10-25 PROCEDURE — 74181 MRI ABDOMEN W/O CONTRAST: CPT

## 2022-10-25 PROCEDURE — 72195 MRI PELVIS W/O DYE: CPT | Mod: 26

## 2022-10-25 PROCEDURE — 74181 MRI ABDOMEN W/O CONTRAST: CPT | Mod: 26

## 2022-10-25 PROCEDURE — 72195 MRI PELVIS W/O DYE: CPT

## 2022-11-10 ENCOUNTER — APPOINTMENT (OUTPATIENT)
Dept: GASTROENTEROLOGY | Facility: CLINIC | Age: 52
End: 2022-11-10

## 2022-11-10 VITALS
OXYGEN SATURATION: 99 % | DIASTOLIC BLOOD PRESSURE: 80 MMHG | BODY MASS INDEX: 33.31 KG/M2 | SYSTOLIC BLOOD PRESSURE: 128 MMHG | WEIGHT: 181 LBS | HEIGHT: 62 IN | HEART RATE: 81 BPM

## 2022-11-10 PROCEDURE — 99214 OFFICE O/P EST MOD 30 MIN: CPT

## 2022-11-10 NOTE — ASSESSMENT
[FreeTextEntry1] : 51F with BMI > 35, rosacea, thrombocytosis, moderately differentiated invasive ductal carcinoma of the right breast receiving adjuvant XRT and Tamoxifen referred by Dr. Evelina Palomino for colon cancer screening, found to have ileitis here for follow up.\par \par #Ileocolitis: Noted incidentally on colonoscopy 7/2022 and persistent ileitis on MRE in 10/2022. Despite absence of features of chronicity in pathology, suspect most likely early mild Crohn’s disease. \par --Fecal calprotectin\par --Avoid NSAIDs\par --Repeat ESR, CRP\par --Can consider monitoring and trending calprotectin and for onset of symptoms. Alternatively, biologic agent such as Stelara can be considered. Discussed both options with patient; given recent events (cancer therapies, etc) and that she is otherwise asymptomatic, she prefers to monitor for now. If symptoms occur (diarrhea, obstructive symptoms, bleeding, abdominal pain, etc), she will inform office and treatment options can be discussed. \par \par #GERD without red flags\par --GERD diet and lifestyle modifications, including: avoidance of acid reflux-inducing foods (excessive caffeine, chocolate, alcohol, peppermint, fatty foods, excessive spices such as garlic, tomato sauce, onions, peppers), avoidance of late meals (eating at least 3+ hours prior to bedtime), head of bed elevation if excessive night-time symptoms, continued weight loss.\par --OK to take antacids or H2B as needed\par \par #CRC Screening: Colonoscopy 7/2022 without polyps. \par --Surveillance timing pending clinical course given ileocolitis; anticipate earlier evaluation to follow up on inflammation. \par \par The patient's symptoms were discussed at length and possible underlying causes, options for further diagnostic testing, and treatment measures were reviewed.

## 2022-11-10 NOTE — PHYSICAL EXAM
[Alert] : alert [Normal Voice/Communication] : normal voice/communication [Healthy Appearing] : healthy appearing [No Acute Distress] : no acute distress [Sclera] : the sclera and conjunctiva were normal [Hearing Threshold Finger Rub Not [place]] : hearing was normal [Normal Lips/Gums] : the lips and gums were normal [Oropharynx] : the oropharynx was normal [Normal Appearance] : the appearance of the neck was normal [No Neck Mass] : no neck mass was observed [No Respiratory Distress] : no respiratory distress [No Acc Muscle Use] : no accessory muscle use [Respiration, Rhythm And Depth] : normal respiratory rhythm and effort [Heart Rate And Rhythm] : heart rate was normal and rhythm regular [Abdomen Tenderness] : non-tender [No Masses] : no abdominal mass palpated [Abdomen Soft] : soft [] : no hepatosplenomegaly [Oriented To Time, Place, And Person] : oriented to person, place, and time [Normal Affect] : the affect was normal

## 2022-11-10 NOTE — HISTORY OF PRESENT ILLNESS
[FreeTextEntry1] : 51F with BMI > 35, rosacea, thrombocytosis, moderately differentiated invasive ductal carcinoma of the right breast receiving adjuvant XRT (completed) and Tamoxifen referred by Dr. Evelina Palomino for colon cancer screening, found to have ileitis here for follow up. \par \par *INTERVAL: Patient underwent colonoscopy for screening on 7/12/2022. Colonoscopy notable for IH, sigmoid and traverse diverticulosis, and diminutive ileal ulcers; biopsies obtained from ileum and throughout colon. Path results with active ileitis in terminal ileum and mild eosinophilic prominence in the biopsies of proximal colon. Reach out to pathology team (Dr. Jamie Nunez); overall eosinophil number is mild and not consistent with eosinophilic colitis and more diffuse involvement than would expect with NSAID-related. At this time, IBD (mild Crohn's ileocolitis) is highest on differential for pathology team. Patient reported rare loose stools, but no other significant GI complaints. She is currently on Ozempic (started 3/2022) and rarely took NSAIDs in past. \par \par Patient reports some mild, intermittent constipation since initiation of Ozempic. No diarrhea, blood in stool, abdominal pain. Reports occasional heartburn, which is usually after consumption of predictable foods (ie. pizza, foods with lots of sauce, etc); symptoms improve with OTC antacids. Notes appetite has been less, but stable with Ozempic and weight slowly declining. \par \par HISTORY: Patient initially seen 4/2022. At that time reported intermittent heartburn symptoms, which can be triggered by diet (including red sauce) or stress. Symptoms previously more frequent, but she has noted improvement in her symptoms after starting Ozempic and losing 10lb. She takes Tums or OTC antacid with relief. She reports her bowel movements are occasionally loose in the setting of stress, but otherwise non-bloody and regular. No reports of nausea, vomiting, dysphagia, unintentional weight loss.\par \par Not on ASA or other blood thinning medications. \par \par No FH of CRC. \par \par Patient works at [employer] as a  at [employer]. \par \par PSH: appendectomy (1996), rotator cuff injury\par \par 10/25/2022 MR Enterography\par IMPRESSION:\par Short segment distal ileitis.\par No evidence of additional small or large bowel inflammation.\par \par 7/12/2022 Colonoscopy Findings:\par  The perianal and digital rectal examinations were normal.\par  Non-bleeding internal hemorrhoids were found during retroflexion and \par  during endoscopy. The hemorrhoids were small.\par  Scattered diverticula were found in the sigmoid colon and transverse \par  colon.\par  The terminal ileum contained a few diminutive ulcers. No bleeding was \par  present. Biopsies were taken with a cold forceps for histology.\par  The exam was otherwise normal throughout the examined colon.\par  Biopsies were taken with a cold forceps in the rectum, in the sigmoid \par  colon, in the descending colon, in the transverse colon, in the \par  ascending colon and in the cecum for histology. Persistent slow bleeding \par  noted at biopsy site in rectum, likely secondary to underlying vessel; \par  for hemostasis, one hemostatic clip was successfully placed in the \par  rectum. There was no bleeding at the end of the procedure.\par

## 2022-11-14 ENCOUNTER — APPOINTMENT (OUTPATIENT)
Dept: BARIATRICS/WEIGHT MGMT | Facility: CLINIC | Age: 52
End: 2022-11-14

## 2022-11-14 VITALS — WEIGHT: 177 LBS | BODY MASS INDEX: 32.37 KG/M2

## 2022-11-14 PROCEDURE — 99214 OFFICE O/P EST MOD 30 MIN: CPT | Mod: 95

## 2022-11-14 NOTE — HISTORY OF PRESENT ILLNESS
[FreeTextEntry1] : Bariatric surgery history: none\par Obesity co-morbidities: HLD, vit d def, GERD\par Comorbidities improved or resolved: none\par Anti-obesity medications: Ozempic\par Obesity medication side effects: none\par \par Ms. LUIS SIEGEL is a 51 year year old female who presents for evaluation and treatment of Class 3 obesity, now Class 2 obesity. \par +Does have an IUD unsure of menopause status\par \par Obesity related co- morbidities: Vit D deficiency, mild GERD - only OTCs\par \par Patient lives -  and kids - 2 boys. 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 \par \par Weight History:\par Lowest adult weight: 130\par Highest adult weight: 215\par \par Interim:\par - been on Ozempic 1mg.  decr appetite, eating healthier inclu going out to dinner.  about the same.  no snacking. \par - f/u with RadOnc in 6 months.\par - has been biking on some weekends with good weather\par - has been eating bkfast, lunch and dinner.  motivated to get out the bikes and go out for more walks w \par - having snacks on hand - > fruits, nuts\par - bkfast around 10am, also making it a point to have lunch @ work.  \par - dinner - eating before 8pm\par -  started walking with her more often, that’s been positive\par \par Has gained 0-5 pounds over the past year.\par \par Obesity began in 20s  Weight gain has occurred with: more sedentary.  First baby weight came off pretty quickly.  nd baby in 2005.  Went up to 200, down to 180s.  In her early 40s, changed diet and got rid of sugar and lost about 35 lbs to 170.  Maintained about a year then gradually increased. \par \par Past weight loss attempts include: WW.  These have produced a maximum of 35 pounds of weight loss.  \par Anti-obesity medications in the past: No. Way back in 20s, herbal pills.\par \par Reasons for desiring weight loss:  not get to winded up the stairs.  wants to be more active. wanting to get in a shape to hike more. likes the outdoors.  Summer.  \par Perceived obstacles to losing weight: sugary stuff\par \par Sleep: 7-8 hours. has gotten better in the last few months.   notices that she tends to breath heavier. \par +has been meditating.  \par \par Has 1 regular meals a day.  Dinner only.  Now - > 3 meals a day. \par \par Diet history:\par wakes up at: 6 am \par B: cup of coffee while she goes to bed, breakfast - yogurt or overnight oats w fruit. \par L: "doesn't have time"-> these days has lunch every day.  leftovers.  \par snacks -  2 pm - yogurt smoothies, nuts, cheese sticks\par at home - really hungry when she didn't bring snacks - chips/crackers/ turkey with cheese -> no snacks these days\par D: both cook 730-8 pm - rice with beans and a protein. mashed potatoes or vegetables, and protein. stays away from red meat. lots of chicken, fish and shrimp as well.  pork sometimes. Lean protein, smaller amount of carbs, with veg.  +cauliflower rice. \par \par Sleep\par bedtime - 10 30pm\par Sleeping better now.  \par \par On weekends has breakfast - egg scramble w peppers onions tomatoes.  sometimes lunch, usually eats out on weekends - will share appetizer, regular meal.  +italian restaurants is one of her favorites. \par \par snacks: yes\par eating after dinner: no\par overeating episodes: sometimes.  maybe 2-3 times a week. \par \par Sodas/fast food/processed foods: take out once a week. varies. +fried, chips\par \par Water intake per day: 4-5 x8 oz cups per day\par 1 cup coffee per day - non dairy creamer.\par \par Physical activity:\par Patient enjoys: walking, biking\par Current physical activity: walking twice a week 30 minutes. \par Have weights, yoga mat. \par \par Habits patient would like to change: eating more regularly\par Level of interest in losing weight: 5/5\par Community support: 4/5\par \par Factors that have helped in the past with losing weight and keeping it off: eating more vegetables and eating consistently\par \par

## 2022-11-14 NOTE — REASON FOR VISIT
[Follow-Up Visit] : a follow-up visit for [Obesity] : obesity [Home] : at home, [unfilled] , at the time of the visit. [Medical Office: ([facility])___] : at the medical office located in

## 2022-11-14 NOTE — ASSESSMENT
[FreeTextEntry1] : 51 y.o female w Class 3 obesity now Class 1 obesity, elev crp, elev LDL, vit d def, presents for weight management\par \par - Continue 1mg Ozempic. Has lost >20# since March, >10% body weight\par - discussed that 3-4 lbs per month is medically appropriate, she continues to maintain >10% weight loss\par - wants to slowly incr activity as tolerated with decr energy - mostly walking, some biking on weekends with nice weather\par - continue bkfast and lunch.  Microwave and 10 minutes.   \par - would like to do yoga from youtube\par - drink 2L water per day.\par - see MARCE Kim\par \par f/u in 6-8 weeks teb

## 2022-11-21 ENCOUNTER — TRANSCRIPTION ENCOUNTER (OUTPATIENT)
Age: 52
End: 2022-11-21

## 2022-11-21 LAB
ALBUMIN SERPL ELPH-MCNC: 4.1 G/DL
ALP BLD-CCNC: 59 U/L
ALT SERPL-CCNC: 12 U/L
ANION GAP SERPL CALC-SCNC: 10 MMOL/L
AST SERPL-CCNC: 16 U/L
BASOPHILS # BLD AUTO: 0.07 K/UL
BASOPHILS NFR BLD AUTO: 0.9 %
BILIRUB DIRECT SERPL-MCNC: 0.1 MG/DL
BILIRUB INDIRECT SERPL-MCNC: 0.2 MG/DL
BILIRUB SERPL-MCNC: 0.3 MG/DL
BUN SERPL-MCNC: 14 MG/DL
CALCIUM SERPL-MCNC: 9.4 MG/DL
CHLORIDE SERPL-SCNC: 107 MMOL/L
CO2 SERPL-SCNC: 25 MMOL/L
CREAT SERPL-MCNC: 0.98 MG/DL
CRP SERPL-MCNC: 5 MG/L
EGFR: 70 ML/MIN/1.73M2
EOSINOPHIL # BLD AUTO: 0.15 K/UL
EOSINOPHIL NFR BLD AUTO: 1.8 %
ERYTHROCYTE [SEDIMENTATION RATE] IN BLOOD BY WESTERGREN METHOD: 16 MM/HR
GLUCOSE SERPL-MCNC: 108 MG/DL
HCT VFR BLD CALC: 42.4 %
HGB BLD-MCNC: 13.1 G/DL
IMM GRANULOCYTES NFR BLD AUTO: 0.5 %
LYMPHOCYTES # BLD AUTO: 1.62 K/UL
LYMPHOCYTES NFR BLD AUTO: 19.8 %
MAN DIFF?: NORMAL
MCHC RBC-ENTMCNC: 30.3 PG
MCHC RBC-ENTMCNC: 30.9 GM/DL
MCV RBC AUTO: 98.1 FL
MONOCYTES # BLD AUTO: 0.51 K/UL
MONOCYTES NFR BLD AUTO: 6.2 %
NEUTROPHILS # BLD AUTO: 5.8 K/UL
NEUTROPHILS NFR BLD AUTO: 70.8 %
PLATELET # BLD AUTO: 391 K/UL
POTASSIUM SERPL-SCNC: 4.2 MMOL/L
PROT SERPL-MCNC: 7.1 G/DL
RBC # BLD: 4.32 M/UL
RBC # FLD: 12 %
SODIUM SERPL-SCNC: 141 MMOL/L
WBC # FLD AUTO: 8.19 K/UL

## 2022-11-22 LAB — CALPROTECTIN FECAL: 158 UG/G

## 2022-12-13 ENCOUNTER — OUTPATIENT (OUTPATIENT)
Dept: OUTPATIENT SERVICES | Facility: HOSPITAL | Age: 52
LOS: 1 days | Discharge: HOME | End: 2022-12-13

## 2022-12-13 DIAGNOSIS — C50.919 MALIGNANT NEOPLASM OF UNSPECIFIED SITE OF UNSPECIFIED FEMALE BREAST: ICD-10-CM

## 2022-12-13 DIAGNOSIS — Z90.49 ACQUIRED ABSENCE OF OTHER SPECIFIED PARTS OF DIGESTIVE TRACT: Chronic | ICD-10-CM

## 2022-12-13 DIAGNOSIS — Z98.890 OTHER SPECIFIED POSTPROCEDURAL STATES: Chronic | ICD-10-CM

## 2022-12-14 ENCOUNTER — APPOINTMENT (OUTPATIENT)
Dept: SURGERY | Facility: CLINIC | Age: 52
End: 2022-12-14

## 2022-12-14 PROCEDURE — 99213K: CUSTOM

## 2022-12-19 ENCOUNTER — APPOINTMENT (OUTPATIENT)
Dept: HEMATOLOGY ONCOLOGY | Facility: CLINIC | Age: 52
End: 2022-12-19

## 2022-12-27 ENCOUNTER — RX RENEWAL (OUTPATIENT)
Age: 52
End: 2022-12-27

## 2023-01-05 ENCOUNTER — APPOINTMENT (OUTPATIENT)
Dept: HEMATOLOGY ONCOLOGY | Facility: CLINIC | Age: 53
End: 2023-01-05
Payer: COMMERCIAL

## 2023-01-05 VITALS
DIASTOLIC BLOOD PRESSURE: 96 MMHG | BODY MASS INDEX: 32.22 KG/M2 | TEMPERATURE: 98.2 F | OXYGEN SATURATION: 98 % | WEIGHT: 176.15 LBS | SYSTOLIC BLOOD PRESSURE: 144 MMHG | RESPIRATION RATE: 17 BRPM | HEART RATE: 77 BPM

## 2023-01-05 PROCEDURE — 99214 OFFICE O/P EST MOD 30 MIN: CPT

## 2023-01-05 NOTE — HISTORY OF PRESENT ILLNESS
[de-identified] : The patient's history of present illness began with a routine mammogram on 05/28/2022, which showed scattered areas of fibroglandular density, and a subcentimeter rounded focal asymmetry in the lower outer right breast with associated calcifications, an additional asymmetry seen in the upper breast only on MLO views; a breast ultrasound performed on that same date demonstrated a focal asymmetry with calcifications in the lower inner right breast and asymmetry in the upper right breast with no mammographic or sonographic evidence of malignancy in the left breast; additional diagnostic breast imaging was recommended.  On 06/02/2022, she underwent a diagnostic mammogram of the right breast with a finding of a group of faint punctate calcifications in the lateral inferior right breast with associated density and on no sonographic correlate and ultrasound performed that same date.  The previously noted asymmetry in the upper right breast predominantly dispersed and was without any sonographic correlate.  The patient then went on to have a stereotactic core biopsy of the right breast lesion on 06/07/2022 with a finding of an invasive moderately differentiated ductal carcinoma, [place] score 6/9, with invasive tumor measuring at least 0.3 centimeters, with evidence of DCIS, flat pattern with high nuclear grade, microcalcifications present in DCIS, no lymphovascular invasion noted, estrogen receptor positive (90%), progesterone receptor positive (90%) and HER-2/mikey negative (1+).  The patient then saw Dr. Kavitha Emmanuel and went on to have a bilateral breast MRI on 06/14/2022 with a finding of a 2.2 centimeter post biopsy hematoma with associated enhancement in the lower outer posterior right breast corresponding to the site of prior biopsy-proven carcinoma; left breast, there was linear branching non mass enhancement in the upper inner mid breast spanning for up to 2.3 centimeters, but no axillary or internal mammary adenopathy seen.  The patient went on to have a MRI guided core biopsy of the left breast findings with evidence of a tiny intraductal papilloma, fibrocystic changes with florid duct hyperplasia and focal calcifications.  \par \par The patient subsequently underwent a right lumpectomy/sentinel lymph node biopsy on 06/28/2022 with a finding of an organizing biopsy cavity and fat necrosis, negative for any tumor; breast with flat epithelial atypia and sclerosing adenosis; 0/4 sentinel lymph nodes were involved with carcinoma.\par \par The patient did well postoperatively and was seen on 7/13/22 in consultation regarding further treatment recommendations. \par \par I had an extensive discussion with Ms. Garcia regarding her T1a N0 Mx, stage IA breast cancer, noting the implications of her pathologic prognostic factors on the subsequent risk of developing both local and metastatic recurrence.\par \par In light of the above, I recommend that she proceed on to adjuvant radiation therapy to decrease the risk of local recurrence.  The patient had an appointment to see Dr. Phillips.\par \par I also discussed that for tumors less than 5 millimeters in size, the aggregate data for breast cancer is that there is no substantive further improvement in overall survival with use of any adjuvant intervention.  Nonetheless, I have reviewed that the NCCN guidelines note to consider adjuvant antiestrogen therapy.  In her case, I noted that as she is premenopausal, that would include adjuvant tamoxifen therapy having discussed its potential risks, benefits, anticipated side effects.  I have noted that there is potentially a very small further reduction risk of developing metastatic recurrence with adjuvant antiestrogen therapy such as tamoxifen in her specific setting, but also reviewed the potential chemopreventive benefit of adjuvant antiestrogen therapy in this setting.\par \par At that, she has also noted that she was quite anxious and shaken by her recent diagnosis.  In addition, she is an  working in a Cancer Center, and this must certainly be adding to her anxiety/adjustment to this diagnosis.  Consequently, I offered her a consultation and potential for treatment with one of our psychologist-oncologists and she has noted she will consider the same and let me know if she wishes to proceed.\par \par She completed a course of adjuvant radiation therapy to the right breast, a dose of 4,240 cGy from 7/27/22 - 8/17/22.\par \par She started adjuvant tamoxifen on 9/29/22.\par  [de-identified] : Seen in routine f/u.\par \par She denies any treatment related side effects- on questioning she does have a mild thin vaginal discharge - easily tolerable. \par \par She c/o ongoing stress / anxiety from her diagnosis - made worse by coming to work in a cancer center daily. Does not wish to be seen by a psychologist or SW  from her center. \par \par She otherwise denies all other complaints noting a a good appetite and excellent performance status.\par \par She continues to gradually lose wt with dietary discretion - reports 49 lb wt loss since 3/22\par \par In the interim diagnosed with Crohn's Disease - being managed by diet / no meds. \par \par LMP 8/22

## 2023-01-05 NOTE — PHYSICAL EXAM
[Fully active, able to carry on all pre-disease performance without restriction] : Status 0 - Fully active, able to carry on all pre-disease performance without restriction [Normal] : affect appropriate [de-identified] : The right breast is status post a lumpectomy with well-healed circumareolar scar; there is no nipple retraction, skin dimpling, or palpable masses noted.  The left breast is without nipple retraction, skin dimpling, or palpable masses.  Bilateral axillae are without adenopathy.

## 2023-01-09 ENCOUNTER — APPOINTMENT (OUTPATIENT)
Dept: BARIATRICS/WEIGHT MGMT | Facility: CLINIC | Age: 53
End: 2023-01-09
Payer: COMMERCIAL

## 2023-01-09 VITALS — BODY MASS INDEX: 32.01 KG/M2 | WEIGHT: 175 LBS

## 2023-01-09 PROCEDURE — 99214 OFFICE O/P EST MOD 30 MIN: CPT | Mod: 95

## 2023-01-09 RX ORDER — AZELAIC ACID 0.15 G/G
15 GEL TOPICAL TWICE DAILY
Qty: 50 | Refills: 2 | Status: DISCONTINUED | COMMUNITY
Start: 2020-06-10 | End: 2023-01-09

## 2023-01-09 NOTE — ASSESSMENT
[FreeTextEntry1] : 51 y.o female w Class 3 obesity now Class 1 obesity, elev crp, elev LDL, vit d def, presents for weight management\par \par - Continue 1mg Ozempic. Has lost >20# since March, >10% body weight. discussed possible coverage changes. refill sent for 3 months\par - doing well overall. would like to lose another ~15 lbs in the next year\par - in 6 months mid year can do labs - incl A1c, fasting insulin, CRP\par - discussed that 3-4 lbs per month is medically appropriate, she continues to maintain >10% weight loss\par - wants to slowly incr activity as tolerated with decr energy - mostly walking, some biking on weekends with nice weather\par - continue bkfast and lunch.  Microwave and 10 minutes.   \par - started yoga\par - drink 2L water per day.\par - see MARCE Kim\par \par f/u in 6-8 weeks teb

## 2023-01-09 NOTE — REASON FOR VISIT
[Follow-Up Visit] : a follow-up visit for [Obesity] : obesity [Home] : at home, [unfilled] , at the time of the visit. [Medical Office: ([facility])___] : at the medical office located in

## 2023-02-23 ENCOUNTER — NON-APPOINTMENT (OUTPATIENT)
Age: 53
End: 2023-02-23

## 2023-02-27 ENCOUNTER — APPOINTMENT (OUTPATIENT)
Dept: BARIATRICS/WEIGHT MGMT | Facility: CLINIC | Age: 53
End: 2023-02-27
Payer: COMMERCIAL

## 2023-02-27 VITALS — BODY MASS INDEX: 30.91 KG/M2 | WEIGHT: 169 LBS

## 2023-02-27 PROCEDURE — 99214 OFFICE O/P EST MOD 30 MIN: CPT | Mod: 95

## 2023-02-27 NOTE — REASON FOR VISIT
[Follow-Up Visit] : a follow-up visit for [Obesity] : obesity [Home] : at home, [unfilled] , at the time of the visit. [Medical Office: ([facility])___] : at the medical office located in

## 2023-02-27 NOTE — HISTORY OF PRESENT ILLNESS
[FreeTextEntry1] : Bariatric surgery history: none\par Obesity co-morbidities: HLD, vit d def, GERD\par Comorbidities improved or resolved: none\par Anti-obesity medications: Ozempic\par Obesity medication side effects: none\par \par Ms. LUIS SIEGEL is a 52 year year old female who presents for evaluation and treatment of Class 3 obesity, now Class 1 obesity. \par +Does have an IUD unsure of menopause status\par \par Obesity related co- morbidities: Vit D deficiency, mild GERD - only OTCs\par \par Patient lives -  and kids - 2 boys. 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 \par \par Weight History:\par Lowest adult weight: 130\par Highest adult weight: 215\par \par Interim:\par - been on Ozempic 1mg.  decr appetite, eating healthier inclu going out to dinner.  about the same.  no snacking. \par - has been walking more, about 7k steps a day, at least 5 days a week - her body is really responding to this\par - f/u with RadOnc in 5 months.\par - has been biking on some weekends with good weather\par - has been eating bkfast, lunch and dinner.  motivated to get out the bikes and go out for more walks w \par - having snacks on hand - > fruits, nuts\par - bkfast around 10am, also making it a point to have lunch @ work.  \par - dinner - eating before 8pm\par -  started walking with her more often, that’s been positive\par \par Has gained 0-5 pounds over the past year.\par \par Obesity began in 20s  Weight gain has occurred with: more sedentary.  First baby weight came off pretty quickly.  nd baby in 2005.  Went up to 200, down to 180s.  In her early 40s, changed diet and got rid of sugar and lost about 35 lbs to 170.  Maintained about a year then gradually increased. \par \par Past weight loss attempts include: WW.  These have produced a maximum of 35 pounds of weight loss.  \par Anti-obesity medications in the past: No. Way back in 20s, herbal pills.\par \par Reasons for desiring weight loss:  not get to winded up the stairs.  wants to be more active. wanting to get in a shape to hike more. likes the outdoors.  Summer.  \par Perceived obstacles to losing weight: sugary stuff\par \par Sleep: 7-8 hours. has gotten better in the last few months.   notices that she tends to breath heavier. \par +has been meditating.  \par \par Has 1 regular meals a day.  Dinner only.  Now - > 3 meals a day. \par \par Diet history:\par wakes up at: 6 am \par B: cup of coffee while she goes to bed, breakfast - yogurt or overnight oats w fruit. \par L: "doesn't have time"-> these days has lunch every day.  leftovers.  \par snacks -  2 pm - yogurt smoothies, nuts, cheese sticks\par at home - really hungry when she didn't bring snacks - chips/crackers/ turkey with cheese -> no snacks these days\par D: both cook 730-8 pm - rice with beans and a protein. mashed potatoes or vegetables, and protein. stays away from red meat. lots of chicken, fish and shrimp as well.  pork sometimes. Lean protein, smaller amount of carbs, with veg.  +cauliflower rice. \par \par Sleep\par bedtime - 10 30pm\par Sleeping better now.  \par \par On weekends has breakfast - egg scramble w peppers onions tomatoes.  sometimes lunch, usually eats out on weekends - will share appetizer, regular meal.  +italian restaurants is one of her favorites. \par \par snacks: yes\par eating after dinner: no\par overeating episodes: sometimes.  maybe 2-3 times a week. \par \par Sodas/fast food/processed foods: take out once a week. varies. +fried, chips\par \par Water intake per day: 4-5 x8 oz cups per day\par 1 cup coffee per day - non dairy creamer.\par \par Physical activity:\par Patient enjoys: walking, biking\par Current physical activity: walking twice a week 30 minutes. \par Have weights, yoga mat. \par \par Habits patient would like to change: eating more regularly\par Level of interest in losing weight: 5/5\par Community support: 4/5\par \par Factors that have helped in the past with losing weight and keeping it off: eating more vegetables and eating consistently\par \par

## 2023-02-27 NOTE — ASSESSMENT
[FreeTextEntry1] : 51 y.o female w Class 3 obesity now Class 1 obesity, elev crp, elev LDL, vit d def, presents for weight management\par \par - Continue 1mg Ozempic. Has lost >45# since March @ 215#, >20% body weight\par - Will send and do PA for Wegovy next time - 1mg\par - doing really well overall. would like to lose another ~10 lbs in the next year\par - in 4-5 months mid year can do labs - incl A1c, fasting insulin, CRP\par - wants to slowly incr activity as tolerated with decr energy - mostly walking, some biking on weekends with nice weather\par - continue bkfast and lunch.  Microwave and 10 minutes.   \par - started yoga\par - drink 2L water per day.\par - see MARCE Kim\par \par f/u in 6-8 weeks teb

## 2023-03-20 ENCOUNTER — RX RENEWAL (OUTPATIENT)
Age: 53
End: 2023-03-20

## 2023-04-09 ENCOUNTER — NON-APPOINTMENT (OUTPATIENT)
Age: 53
End: 2023-04-09

## 2023-04-10 ENCOUNTER — APPOINTMENT (OUTPATIENT)
Dept: BARIATRICS/WEIGHT MGMT | Facility: CLINIC | Age: 53
End: 2023-04-10
Payer: COMMERCIAL

## 2023-04-10 VITALS — BODY MASS INDEX: 30.91 KG/M2 | WEIGHT: 169 LBS

## 2023-04-10 PROCEDURE — 99214 OFFICE O/P EST MOD 30 MIN: CPT | Mod: 95

## 2023-04-10 NOTE — ASSESSMENT
[FreeTextEntry1] : 51 y.o female w Class 3 obesity now Class 1 obesity, elev crp, elev LDL, vit d def, presents for weight management\par \par - Continue 1mg Ozempic. Has lost >45# since March @ 215#, >20% body weight\par - mood improved with more sunlight\par - Will send and do PA for Wegovy in about 3 months\par - doing really well overall. would like to lose another ~10 lbs in the next year\par - in 4-5 months mid year can do labs - incl A1c, fasting insulin, CRP\par - wants to slowly incr activity as tolerated with decr energy - mostly walking, some biking on weekends with nice weather\par - continue bkfast and lunch.  Microwave and 10 minutes.   \par - started yoga\par - drink 2L water per day.\par - see MARCE Kim\par \par f/u in 6-8 weeks teb

## 2023-04-10 NOTE — REASON FOR VISIT
[Follow-Up Visit] : a follow-up visit for [Obesity] : obesity [Home] : at home, [unfilled] , at the time of the visit. [Medical Office: ([facility])___] : at the medical office located in

## 2023-04-10 NOTE — HISTORY OF PRESENT ILLNESS
[FreeTextEntry1] : Bariatric surgery history: none\par Obesity co-morbidities: HLD, vit d def, GERD\par Comorbidities improved or resolved: none\par Anti-obesity medications: Ozempic\par Obesity medication side effects: none\par \par Ms. LUIS SIEGEL is a 52 year year old female who presents for evaluation and treatment of Class 3 obesity, now Class 1 obesity. \par +Does have an IUD unsure of menopause status\par \par Obesity related co- morbidities: Vit D deficiency, mild GERD - only OTCs\par \par Patient lives -  and kids - 2 boys. 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 \par \par Weight History:\par Lowest adult weight: 130\par Highest adult weight: 215\par \par Interim:\par - been on Ozempic 1mg.  decr appetite, eating healthier inclu going out to dinner.  about the same.  no snacking. \par - has been walking more, about 7k steps a day, at least 5 days a week - her body is really responding to this\par - f/u with [facility] in 5 months.\par - has been biking on some weekends with good weather, and walking more outside\par - has been eating bkfast, lunch and dinner.  motivated to get out the bikes and go out for more walks w \par - having snacks on hand - > fruits, nuts\par - bkfast around 10am, also making it a point to have lunch @ work.  \par - dinner - eating before 8pm\par -  started walking with her more often, that’s been positive\par \par Has gained 0-5 pounds over the past year.\par \par Obesity began in 20s  Weight gain has occurred with: more sedentary.  First baby weight came off pretty quickly.  nd baby in 2005.  Went up to 200, down to 180s.  In her early 40s, changed diet and got rid of sugar and lost about 35 lbs to 170.  Maintained about a year then gradually increased. \par \par Past weight loss attempts include: WW.  These have produced a maximum of 35 pounds of weight loss.  \par Anti-obesity medications in the past: No. Way back in 20s, herbal pills.\par \par Reasons for desiring weight loss:  not get to winded up the stairs.  wants to be more active. wanting to get in a shape to hike more. likes the outdoors.  Summer.  \par Perceived obstacles to losing weight: sugary stuff\par \par Sleep: 7-8 hours. has gotten better in the last few months.   notices that she tends to breath heavier. \par +has been meditating.  \par \par Has 1 regular meals a day.  Dinner only.  Now - > 3 meals a day. \par \par Diet history:\par wakes up at: 6 am \par B: cup of coffee while she goes to bed, breakfast - yogurt or overnight oats w fruit. \par L: "doesn't have time"-> these days has lunch every day.  leftovers.  \par snacks -  2 pm - yogurt smoothies, nuts, cheese sticks\par at home - really hungry when she didn't bring snacks - chips/crackers/ turkey with cheese -> no snacks these days\par D: both cook 730-8 pm - rice with beans and a protein. mashed potatoes or vegetables, and protein. stays away from red meat. lots of chicken, fish and shrimp as well.  pork sometimes. Lean protein, smaller amount of carbs, with veg.  +cauliflower rice. \par \par Sleep\par bedtime - 10 30pm\par Sleeping better now.  \par \par On weekends has breakfast - egg scramble w peppers onions tomatoes.  sometimes lunch, usually eats out on weekends - will share appetizer, regular meal.  +italian restaurants is one of her favorites. \par \par snacks: yes\par eating after dinner: no\par overeating episodes: sometimes.  maybe 2-3 times a week. \par \par Sodas/fast food/processed foods: take out once a week. varies. +fried, chips\par \par Water intake per day: 4-5 x8 oz cups per day\par 1 cup coffee per day - non dairy creamer.\par \par Physical activity:\par Patient enjoys: walking, biking\par Current physical activity: walking twice a week 30 minutes.  going for more walks outside.\par Have weights, yoga mat. \par \par Habits patient would like to change: eating more regularly\par Level of interest in losing weight: 5/5\par Community support: 4/5\par \par Factors that have helped in the past with losing weight and keeping it off: eating more vegetables and eating consistently\par \par

## 2023-04-21 ENCOUNTER — APPOINTMENT (OUTPATIENT)
Dept: INTERNAL MEDICINE | Facility: CLINIC | Age: 53
End: 2023-04-21
Payer: COMMERCIAL

## 2023-04-21 VITALS
SYSTOLIC BLOOD PRESSURE: 120 MMHG | WEIGHT: 166 LBS | OXYGEN SATURATION: 98 % | HEIGHT: 62 IN | BODY MASS INDEX: 30.55 KG/M2 | HEART RATE: 87 BPM | DIASTOLIC BLOOD PRESSURE: 86 MMHG

## 2023-04-21 PROCEDURE — 99396 PREV VISIT EST AGE 40-64: CPT

## 2023-04-21 NOTE — PAST MEDICAL HISTORY
[Less Bleeding] : the period was lighter than normal [Irregular Cycle Intervals] : are  irregular [Total Preg ___] : G: [unfilled] [Full Term ___] : Full Term: [unfilled]  [Perimenopausal] : hx of being perimenopausal

## 2023-04-24 ENCOUNTER — APPOINTMENT (OUTPATIENT)
Dept: GASTROENTEROLOGY | Facility: CLINIC | Age: 53
End: 2023-04-24
Payer: COMMERCIAL

## 2023-04-24 VITALS
HEART RATE: 88 BPM | WEIGHT: 168 LBS | SYSTOLIC BLOOD PRESSURE: 115 MMHG | HEIGHT: 62 IN | OXYGEN SATURATION: 98 % | BODY MASS INDEX: 30.91 KG/M2 | DIASTOLIC BLOOD PRESSURE: 80 MMHG

## 2023-04-24 DIAGNOSIS — R12 HEARTBURN: ICD-10-CM

## 2023-04-24 PROCEDURE — 36415 COLL VENOUS BLD VENIPUNCTURE: CPT

## 2023-04-24 PROCEDURE — 99214 OFFICE O/P EST MOD 30 MIN: CPT | Mod: 25

## 2023-04-24 NOTE — HISTORY OF PRESENT ILLNESS
[FreeTextEntry1] : 52F with BMI > 35, rosacea, thrombocytosis, moderately differentiated invasive ductal carcinoma of the right breast receiving adjuvant XRT (completed) and Tamoxifen referred by Dr. Evelina Palomino for colon cancer screening, found to have ileitis here for follow up. \par \par *INTERVAL: Fecal calprotectin had been elevated in 11/2022. As patient otherwise asymptomatic, patient opted to continue monitoring of inflammatory markers and hold off on any treatment of potential Crohn’s disease. \par Today, still reports intermittent loose stools, which she attributes to stress. She has occasional days without any bowel movements and these are often followed by looser stools. No abdominal pain. No blood in stool. Remains on Ozempic. Weight loss has improved heartburn.\par \par HISTORY: Patient initially seen 4/2022. At that time reported intermittent heartburn symptoms, which can be triggered by diet (including red sauce) or stress. Symptoms previously more frequent, but she has noted improvement in her symptoms after starting Ozempic and losing 10lb. She takes Tums or OTC antacid with relief. She reports her bowel movements are occasionally loose in the setting of stress, but otherwise non-bloody and regular. No reports of nausea, vomiting, dysphagia, unintentional weight loss.\par \par Patient underwent colonoscopy for screening on 7/12/2022. Colonoscopy notable for IH, sigmoid and traverse diverticulosis, and diminutive ileal ulcers; biopsies obtained from ileum and throughout colon. Path results with active ileitis in terminal ileum and mild eosinophilic prominence in the biopsies of proximal colon. Reach out to pathology team (Dr. Jamie Nunez); overall eosinophil number is mild and not consistent with eosinophilic colitis and more diffuse involvement than would expect with NSAID-related. At this time, IBD (mild Crohn's ileocolitis) is highest on differential for pathology team. Patient reported rare loose stools, but no other significant GI complaints. She was on Ozempic (started 3/2022) and rarely took NSAIDs in past. \par \par At 11/2022 visit, patient reported mild, intermittent constipation since initiation of Ozempic. No diarrhea, blood in stool, abdominal pain. Reports occasional heartburn, which is usually after consumption of predictable foods (ie. pizza, foods with lots of sauce, etc); symptoms improve with OTC antacids. Notes appetite has been less, but stable with Ozempic and weight slowly declining. \par \par Not on ASA or other blood thinning medications. \par \par No FH of CRC. \par \par Patient works at [email or web address] as a  at [employer]. \par \par PSH: appendectomy (1996), rotator cuff injury\par \par 10/25/2022 MR Enterography\par IMPRESSION:\par Short segment distal ileitis.\par No evidence of additional small or large bowel inflammation.\par \par 7/12/2022 Colonoscopy Findings:\par  The perianal and digital rectal examinations were normal.\par  Non-bleeding internal hemorrhoids were found during retroflexion and \par  during endoscopy. The hemorrhoids were small.\par  Scattered diverticula were found in the sigmoid colon and transverse \par  colon.\par  The terminal ileum contained a few diminutive ulcers. No bleeding was \par  present. Biopsies were taken with a cold forceps for histology.\par  The exam was otherwise normal throughout the examined colon.\par  Biopsies were taken with a cold forceps in the rectum, in the sigmoid \par  colon, in the descending colon, in the transverse colon, in the \par  ascending colon and in the cecum for histology. Persistent slow bleeding \par  noted at biopsy site in rectum, likely secondary to underlying vessel; \par  for hemostasis, one hemostatic clip was successfully placed in the \par  rectum. There was no bleeding at the end of the procedure.\par

## 2023-04-24 NOTE — ASSESSMENT
[FreeTextEntry1] : 52F with BMI > 35, rosacea, thrombocytosis, moderately differentiated invasive ductal carcinoma of the right breast receiving adjuvant XRT (completed) and Tamoxifen referred by Dr. Evelina Palomino for colon cancer screening, found to have ileitis here for follow up. \par \par #Ileocolitis: Noted incidentally on colonoscopy 7/2022 and persistent ileitis on MRE in 10/2022. Despite absence of features of chronicity in pathology, suspect most likely early mild Crohn’s disease. Fecal calprotectin elevated at time of evaluation in 11/2022\par --Repeat fecal calprotectin, CRP\par --Check Vit B12, CBC\par --Avoid NSAIDs\par --Can continue monitoring and trending calprotectin and for onset of symptoms. Pending results, will consider VCE as discussed with patient. Alternatively, biologic agent such as Stelara can be considered. Both options previously reviewed with patient; given other clinical events (cancer therapies, etc) and that she is otherwise asymptomatic, she prefers to monitor for now if symptoms and labs remain stable. If symptoms occur (diarrhea, obstructive symptoms, bleeding, abdominal pain, etc), she will inform office and treatment options can be discussed. \par \par #GERD without red flags: Continues to improve with ongoing weight loss\par --GERD diet and lifestyle modifications, including: avoidance of acid reflux-inducing foods (excessive caffeine, chocolate, alcohol, peppermint, fatty foods, excessive spices such as garlic, tomato sauce, onions, peppers), avoidance of late meals (eating at least 3+ hours prior to bedtime), head of bed elevation if excessive night-time symptoms, continued weight loss.\par --OK to take antacids or H2B as needed\par \par #CRC Screening: Colonoscopy 7/2022 without polyps. \par --Surveillance timing pending clinical course given ileocolitis; anticipate earlier evaluation to follow up on inflammation. Otherwise 10 year interval recommended for screening purposes (2032)\par \par The patient's symptoms were discussed at length and possible underlying causes, options for further diagnostic testing, and treatment measures were reviewed.

## 2023-04-24 NOTE — PHYSICAL EXAM
[Normal] : alert, normal voice/communication, healthy appearing, no acute distress [Sclera] : the sclera and conjunctiva were normal [Hearing Threshold Finger Rub Not [place]] : hearing was normal [Normal Appearance] : the appearance of the neck was normal [No Respiratory Distress] : no respiratory distress [No Acc Muscle Use] : no accessory muscle use [Abdomen Tenderness] : non-tender [Respiration, Rhythm And Depth] : normal respiratory rhythm and effort [No Masses] : no abdominal mass palpated [Abdomen Soft] : soft [Oriented To Time, Place, And Person] : oriented to person, place, and time [Normal Affect] : the affect was normal

## 2023-04-25 ENCOUNTER — TRANSCRIPTION ENCOUNTER (OUTPATIENT)
Age: 53
End: 2023-04-25

## 2023-04-29 ENCOUNTER — TRANSCRIPTION ENCOUNTER (OUTPATIENT)
Age: 53
End: 2023-04-29

## 2023-04-29 LAB
25(OH)D3 SERPL-MCNC: 18.6 NG/ML
CHOLEST SERPL-MCNC: 149 MG/DL
ESTIMATED AVERAGE GLUCOSE: 105 MG/DL
HBA1C MFR BLD HPLC: 5.3 %
HDLC SERPL-MCNC: 53 MG/DL
LDLC SERPL CALC-MCNC: 73 MG/DL
NONHDLC SERPL-MCNC: 96 MG/DL
TRIGL SERPL-MCNC: 116 MG/DL
TSH SERPL-ACNC: 1.48 UIU/ML

## 2023-04-29 NOTE — HEALTH RISK ASSESSMENT
[Patient reported mammogram was abnormal] : Patient reported mammogram was abnormal [Patient reported PAP Smear was normal] : Patient reported PAP Smear was normal [Patient reported colonoscopy was normal] : Patient reported colonoscopy was normal [MammogramDate] : 06/22 [MammogramComments] : dcis [PapSmearDate] : 04/22 [PapSmearComments] : will f/up with gyn [ColonoscopyDate] : 07/22 [ColonoscopyComments] : except ileocolitis

## 2023-04-29 NOTE — REVIEW OF SYSTEMS
[Recent Change In Weight] : ~T recent weight change [Headache] : headache [Anxiety] : anxiety [Depression] : depression [Negative] : Musculoskeletal [Fever] : no fever [Chills] : no chills [Fatigue] : no fatigue [Chest Pain] : no chest pain [Palpitations] : no palpitations [Shortness Of Breath] : no shortness of breath [Cough] : no cough [Dyspnea on Exertion] : no dyspnea on exertion [Abdominal Pain] : no abdominal pain [Nausea] : no nausea [Vomiting] : no vomiting [Heartburn] : no heartburn [Melena] : no melena [Dysuria] : no dysuria [Vaginal Discharge] : no vaginal discharge [Skin Rash] : no skin rash [Dizziness] : no dizziness [Fainting] : no fainting [Easy Bleeding] : no easy bleeding [Easy Bruising] : no easy bruising [FreeTextEntry2] : diet is good, 3 meals/day, will have coffee, breakfast at 10, lunch 2, will prepare food/snack, will eat dinner, walking for exercise and biking [FreeTextEntry3] : wears progressives, saw optho few years ago [FreeTextEntry7] : see hpi, gerd improved with weight loss [FreeTextEntry8] : see hpi, occasional vaginal discharge [de-identified] : rare HA [de-identified] : see hpi

## 2023-04-29 NOTE — PHYSICAL EXAM
[No Acute Distress] : no acute distress [Well Nourished] : well nourished [Well Developed] : well developed [Well-Appearing] : well-appearing [PERRL] : pupils equal round and reactive to light [EOMI] : extraocular movements intact [Normal Oropharynx] : the oropharynx was normal [No Lymphadenopathy] : no lymphadenopathy [Supple] : supple [No Respiratory Distress] : no respiratory distress  [No Accessory Muscle Use] : no accessory muscle use [Clear to Auscultation] : lungs were clear to auscultation bilaterally [Normal Rate] : normal rate  [Regular Rhythm] : with a regular rhythm [Normal S1, S2] : normal S1 and S2 [No Murmur] : no murmur heard [No Carotid Bruits] : no carotid bruits [Pedal Pulses Present] : the pedal pulses are present [No Edema] : there was no peripheral edema [Normal Appearance] : normal in appearance [No Nipple Discharge] : no nipple discharge [Soft] : abdomen soft [No Axillary Lymphadenopathy] : no axillary lymphadenopathy [Non Tender] : non-tender [Non-distended] : non-distended [No Masses] : no abdominal mass palpated [No HSM] : no HSM [Normal Bowel Sounds] : normal bowel sounds [Normal Supraclavicular Nodes] : no supraclavicular lymphadenopathy [Normal Axillary Nodes] : no axillary lymphadenopathy [Normal Posterior Cervical Nodes] : no posterior cervical lymphadenopathy [Normal Anterior Cervical Nodes] : no anterior cervical lymphadenopathy [Normal Inguinal Nodes] : no inguinal lymphadenopathy [Normal Gait] : normal gait [No Joint Swelling] : no joint swelling [Normal Affect] : the affect was normal [de-identified] : ?slight thyromegaly without nodules or asymmetry  [de-identified] : b/l cerumen impaction [de-identified] : scar right lateral areola and right axillary region from LN dissection [de-identified] : 1 cm round erythematous patch left axilla without scale (notes had rim previously)

## 2023-04-29 NOTE — ADDENDUM
[FreeTextEntry1] : 4/29/23:  Labs nl except vitamin d 18 c/w insufficiency, to take supplement - messaged/mailed to pt.

## 2023-04-29 NOTE — ASSESSMENT
[FreeTextEntry1] : 51 yo female with h/o as above including breast cancer right breast s/p lumpectomy/LN dissection, radiation and tamoxifen, obesity, anxiety, ileitis (possible early crohn's), here for CPE.\par 1.  Gyn - will f/up with gyn for pap and will get f/up breast imaging/mammo next few months, following regularly with oncology\par 2.  GI - colonoscopy utd and will f/up with GI for repeat eval for ileitis, not on medication as asymptomatic\par 3.  Psych - anxiety due to recent diagnoses, referred to in-house behavioral health\par 4.  Derm - trial clotrimazole cream for small red patch left axilla\par 5.  Endo - working on diet/exercise/weight loss with weight management, minimally enlarged thyroid but no other palpable abnl so will check TSH for now and observe; check vitamin D for deficiency previously, A1c and lipid as well\par 6.  ENT - trial debrox for cerumen impaction\par 7.  HCM - had cbc, cmp done recently, check other labs below; consider shingrix, other vaccines utd\par 8.  RTO prn or 1 year\par \par \par \par

## 2023-04-29 NOTE — HISTORY OF PRESENT ILLNESS
[FreeTextEntry1] : physical [de-identified] : 53 yo female with h/o as below here for CPE.\par Facing one year froilan for breast cancer, has to go for diagnostic mammo next month, very emotional and having hard time coping, interested in therapy.  \par Hasn't had any side effects with tamoxifen but rare night sweats.  Hasn't had menses since August, got spotting in February and then last week had full period and was very heavy.  Will see gyn next month.\par Will see GI next week, needs f/up for ileocolitis.  Will have constipation for a few days and then suddenly will have loose stools, always attributed to stress level, never had crazy pain.  No blood in stool, not sure if had mucus in stool.\par Also has been working with weight management for past year, lost a lot of weight on ozempic, being transitioned to wegovy due to insurance issues.\par No other active issues.

## 2023-05-03 ENCOUNTER — TRANSCRIPTION ENCOUNTER (OUTPATIENT)
Age: 53
End: 2023-05-03

## 2023-05-05 LAB
CRP SERPL-MCNC: <3 MG/L
HCT VFR BLD CALC: 44.5 %
HGB BLD-MCNC: 13.7 G/DL
MCHC RBC-ENTMCNC: 30.8 GM/DL
MCHC RBC-ENTMCNC: 31.1 PG
MCV RBC AUTO: 101.1 FL
PLATELET # BLD AUTO: 379 K/UL
RBC # BLD: 4.4 M/UL
RBC # FLD: 12 %
VIT B12 SERPL-MCNC: 202 PG/ML
WBC # FLD AUTO: 7.57 K/UL

## 2023-05-05 RX ORDER — MAGNESIUM 200 MG
1000 TABLET ORAL TWICE DAILY
Qty: 60 | Refills: 4 | Status: ACTIVE | COMMUNITY
Start: 2023-05-05 | End: 1900-01-01

## 2023-05-27 ENCOUNTER — APPOINTMENT (OUTPATIENT)
Dept: MAMMOGRAPHY | Facility: CLINIC | Age: 53
End: 2023-05-27
Payer: COMMERCIAL

## 2023-05-27 ENCOUNTER — APPOINTMENT (OUTPATIENT)
Dept: ULTRASOUND IMAGING | Facility: CLINIC | Age: 53
End: 2023-05-27
Payer: COMMERCIAL

## 2023-05-27 ENCOUNTER — OUTPATIENT (OUTPATIENT)
Dept: OUTPATIENT SERVICES | Facility: HOSPITAL | Age: 53
LOS: 1 days | End: 2023-05-27
Payer: COMMERCIAL

## 2023-05-27 DIAGNOSIS — Z98.890 OTHER SPECIFIED POSTPROCEDURAL STATES: Chronic | ICD-10-CM

## 2023-05-27 DIAGNOSIS — Z00.8 ENCOUNTER FOR OTHER GENERAL EXAMINATION: ICD-10-CM

## 2023-05-27 DIAGNOSIS — Z90.49 ACQUIRED ABSENCE OF OTHER SPECIFIED PARTS OF DIGESTIVE TRACT: Chronic | ICD-10-CM

## 2023-05-27 PROCEDURE — G0279: CPT | Mod: 26

## 2023-05-27 PROCEDURE — 77066 DX MAMMO INCL CAD BI: CPT | Mod: 26

## 2023-05-27 PROCEDURE — 76641 ULTRASOUND BREAST COMPLETE: CPT

## 2023-05-27 PROCEDURE — 76641 ULTRASOUND BREAST COMPLETE: CPT | Mod: 26,50

## 2023-05-27 PROCEDURE — 77066 DX MAMMO INCL CAD BI: CPT

## 2023-05-27 PROCEDURE — G0279: CPT

## 2023-06-05 LAB
IF BLOCK AB SER QL: 1 AU/ML
PCA AB SER QL IF: NORMAL

## 2023-06-06 LAB — CALPROTECTIN FECAL: 241 UG/G

## 2023-06-08 ENCOUNTER — APPOINTMENT (OUTPATIENT)
Dept: BARIATRICS/WEIGHT MGMT | Facility: CLINIC | Age: 53
End: 2023-06-08
Payer: COMMERCIAL

## 2023-06-08 VITALS — WEIGHT: 165 LBS | BODY MASS INDEX: 30.18 KG/M2

## 2023-06-08 PROCEDURE — 99214 OFFICE O/P EST MOD 30 MIN: CPT | Mod: 95

## 2023-06-12 NOTE — HISTORY OF PRESENT ILLNESS
[FreeTextEntry1] : Bariatric surgery history: none\par Obesity co-morbidities: HLD, vit d def, GERD\par Comorbidities improved or resolved: none\par Anti-obesity medications: Wegovy\par Obesity medication side effects: none\par \par Ms. LUIS SIEGEL is a 52 year year old female who presents for evaluation and treatment of Class 3 obesity, now Class 1 obesity. \par +Does have an IUD unsure of menopause status\par \par Obesity related co- morbidities: Vit D deficiency, mild GERD - only OTCs\par \par Patient lives -  and kids - 2 boys. 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 \par \par Weight History:\par Lowest adult weight: 130\par Highest adult weight: 215\par \par Interim:\par - been on Ozempic 1mg.  decr appetite, eating healthier including going out to dinner.  about the same.  no snacking. \par - 10k steps per day.  \par - has been biking on some weekends with good weather, and walking more outside\par - has been eating bkfast, luncih and dinner.  motivated to get out the bikes and go out for more walks w \par - having snacks on hand - > fruits, nuts\par - bkfast around 10am, also making it a point to have lunch @ work.  \par - dinner - eating before 8pm\par -  started walking with her more often, that’s been positive\par \par Has gained 0-5 pounds over the past year.\par \par Obesity began in 20s  Weight gain has occurred with: more sedentary.  First baby weight came off pretty quickly.  nd baby in 2005.  Went up to 200, down to 180s.  In her early 40s, changed diet and got rid of sugar and lost about 35 lbs to 170.  Maintained about a year then gradually increased. \par \par Past weight loss attempts include: WW.  These have produced a maximum of 35 pounds of weight loss.  \par Anti-obesity medications in the past: No. Way back in 20s, herbal pills.\par \par Reasons for desiring weight loss:  not get to winded up the stairs.  wants to be more active. wanting to get in a shape to hike more. likes the outdoors.  Summer.  \par Perceived obstacles to losing weight: sugary stuff\par \par Sleep: 7-8 hours. has gotten better in the last few months.   notices that she tends to breath heavier. \par +has been meditating.  \par \par Has 1 regular meals a day.  Dinner only.  Now - > 3 meals a day. \par \par Diet history:\par wakes up at: 6 am \par B: cup of coffee while she goes to bed, breakfast - yogurt or overnight oats w fruit. \par L: "doesn't have time"-> these days has lunch every day.  leftovers.  \par snacks -  2 pm - yogurt smoothies, nuts, cheese sticks\par at home - really hungry when she didn't bring snacks - chips/crackers/ turkey with cheese -> no snacks these days\par D: both cook 730-8 pm - rice with beans and a protein. mashed potatoes or vegetables, and protein. stays away from red meat. lots of chicken, fish and shrimp as well.  pork sometimes. Lean protein, smaller amount of carbs, with veg.  +cauliflower rice. \par \par Sleep\par bedtime - 10 30pm\par Sleeping better now.  \par \par On weekends has breakfast - egg scramble w peppers onions tomatoes.  sometimes lunch, usually eats out on weekends - will share appetizer, regular meal.  +italian restaurants is one of her favorites. \par \par snacks: yes\par eating after dinner: no\par overeating episodes: sometimes.  maybe 2-3 times a week. \par \par Sodas/fast food/processed foods: take out once a week. varies. +fried, chips\par \par Water intake per day: 4-5 x8 oz cups per day\par 1 cup coffee per day - non dairy creamer.\par \par Physical activity:\par Patient enjoys: walking, biking\par Current physical activity: walking twice a week 30 minutes.  going for more walks outside.\par Have weights, yoga mat. \par \par Habits patient would like to change: eating more regularly\par Level of interest in losing weight: 5/5\par Community support: 4/5\par \par Factors that have helped in the past with losing weight and keeping it off: eating more vegetables and eating consistently\par \par

## 2023-06-12 NOTE — REASON FOR VISIT
[Follow-Up Visit] : a follow-up visit for [Obesity] : obesity [Home] : at home, [unfilled] , at the time of the visit. [Medical Office: ([facility])___] : at the medical office located in  [Verbal consent obtained from patient] : the patient, [unfilled]

## 2023-06-12 NOTE — ASSESSMENT
[FreeTextEntry1] : 51 y.o female w Class 3 obesity now Class 1 obesity, elev crp, elev LDL, vit d def, presents for weight management\par \par - incr to Wegovy 1.7. Has lost >45# since March @ 215#, >20% body weight\par - mood improved with more sunlight\par - doing really well overall. would like to lose another ~10 lbs in the next year\par - in 3 months mid year can do labs - incl A1c, fasting insulin, CRP\par - wants to slowly incr activity as tolerated with decr energy - mostly walking, some biking on weekends with nice weather\par - continue bkfast and lunch.  Microwave and 10 minutes.   \par - started yoga\par - drink 2L water per day.\par - see MARCE Kim\par \par f/u in 6-8 weeks teb

## 2023-06-14 ENCOUNTER — RX RENEWAL (OUTPATIENT)
Age: 53
End: 2023-06-14

## 2023-06-21 RX ORDER — SEMAGLUTIDE 1.34 MG/ML
4 INJECTION, SOLUTION SUBCUTANEOUS
Qty: 9 | Refills: 0 | Status: DISCONTINUED | COMMUNITY
Start: 2022-03-28 | End: 2023-06-21

## 2023-07-04 ENCOUNTER — TRANSCRIPTION ENCOUNTER (OUTPATIENT)
Age: 53
End: 2023-07-04

## 2023-07-13 ENCOUNTER — APPOINTMENT (OUTPATIENT)
Dept: INTERNAL MEDICINE | Facility: CLINIC | Age: 53
End: 2023-07-13

## 2023-07-15 ENCOUNTER — TRANSCRIPTION ENCOUNTER (OUTPATIENT)
Age: 53
End: 2023-07-15

## 2023-07-27 ENCOUNTER — APPOINTMENT (OUTPATIENT)
Dept: BARIATRICS/WEIGHT MGMT | Facility: CLINIC | Age: 53
End: 2023-07-27
Payer: COMMERCIAL

## 2023-07-27 VITALS — BODY MASS INDEX: 30 KG/M2 | WEIGHT: 164 LBS

## 2023-07-27 PROCEDURE — 99214 OFFICE O/P EST MOD 30 MIN: CPT | Mod: 95

## 2023-07-27 NOTE — HISTORY OF PRESENT ILLNESS
[FreeTextEntry1] : Bariatric surgery history: none\par Obesity co-morbidities: HLD, vit d def, GERD\par Comorbidities improved or resolved: none\par Anti-obesity medications: Wegovy\par Obesity medication side effects: none\par \par Ms. LUIS SIEGEL is a 52 year year old female who presents for evaluation and treatment of Class 3 obesity, now Class 1 obesity. \par +Does have an IUD unsure of menopause status\par \par Obesity related co- morbidities: Vit D deficiency, mild GERD - only OTCs\par \par Patient lives -  and kids - 2 boys. 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 \par \par Weight History:\par Lowest adult weight: 130\par Highest adult weight: 215\par \par Interim:\par - just started wegovy 1.7mg\par -  decr appetite, eating healthier including going out to dinner.  about the same.  no snacking. \par - 10k steps per day.  \par - scheduled to see GI, concerned about IBD.  fluctuates between constipation/diarrhea\par - has been biking on some weekends with good weather, and walking more outside\par - has been eating bkfast, lunch and dinner.  motivated to get out the bikes and go out for more walks w \par - having snacks on hand - > fruits, nuts\par - bkfast around 10am, also making it a point to have lunch @ work.  \par - dinner - eating before 8pm\par -  started walking with her more often, that’s been positive\par \par Has gained 0-5 pounds over the past year.\par \par Obesity began in 20s  Weight gain has occurred with: more sedentary.  First baby weight came off pretty quickly.  nd baby in 2005.  Went up to 200, down to 180s.  In her early 40s, changed diet and got rid of sugar and lost about 35 lbs to 170.  Maintained about a year then gradually increased. \par \par Past weight loss attempts include: WW.  These have produced a maximum of 35 pounds of weight loss.  \par Anti-obesity medications in the past: No. Way back in 20s, herbal pills.\par \par Reasons for desiring weight loss:  not get to winded up the stairs.  wants to be more active. wanting to get in a shape to hike more. likes the outdoors.  Summer.  \par Perceived obstacles to losing weight: sugary stuff\par \par Sleep: 7-8 hours. has gotten better in the last few months.   notices that she tends to breath heavier. \par +has been meditating.  \par \par Has 1 regular meals a day.  Dinner only.  Now - > 3 meals a day. \par \par Diet history:\par wakes up at: 6 am \par B: cup of coffee while she goes to bed, breakfast - yogurt or overnight oats w fruit. \par L: "doesn't have time"-> these days has lunch every day.  leftovers.  \par snacks -  2 pm - yogurt smoothies, nuts, cheese sticks\par at home - really hungry when she didn't bring snacks - chips/crackers/ turkey with cheese -> no snacks these days\par D: both cook 730-8 pm - rice with beans and a protein. mashed potatoes or vegetables, and protein. stays away from red meat. lots of chicken, fish and shrimp as well.  pork sometimes. Lean protein, smaller amount of carbs, with veg.  +cauliflower rice. \par \par Sleep\par bedtime - 10 30pm\par Sleeping better now.  \par \par On weekends has breakfast - egg scramble w peppers onions tomatoes.  sometimes lunch, usually eats out on weekends - will share appetizer, regular meal.  +italian restaurants is one of her favorites. \par \par snacks: yes\par eating after dinner: no\par overeating episodes: sometimes.  maybe 2-3 times a week. \par \par Sodas/fast food/processed foods: take out once a week. varies. +fried, chips\par \par Water intake per day: 4-5 x8 oz cups per day\par 1 cup coffee per day - non dairy creamer.\par \par Physical activity:\par Patient enjoys: walking, biking\par Current physical activity: walking twice a week 30 minutes.  going for more walks outside.\par Have weights, yoga mat. \par

## 2023-07-27 NOTE — REASON FOR VISIT
[Follow-Up Visit] : a follow-up visit for [Obesity] : obesity [Home] : at home, [unfilled] , at the time of the visit. [Medical Office: ([facility])___] : at the medical office located in  [Verbal consent obtained from patient] : the patient, [unfilled]

## 2023-07-27 NOTE — ASSESSMENT
[FreeTextEntry1] : 51 y.o female w Class 3 obesity now Class 1 obesity, elev crp, elev LDL, vit d def, presents for weight management\par \par - continue Wegovy 1.7. Has lost >45# since March @ 215#, >20% body weight.  doing well overall\par - movement increased in the summer, doing well, avoiding wt regain\par - doing really well overall. would like to lose another ~10 lbs in the next year\par - in 3 months mid year can do labs - incl A1c, fasting insulin, CRP\par - wants to slowly incr activity as tolerated with decr energy - mostly walking, some biking on weekends with nice weather\par - continue bkfast and lunch.  Microwave and 10 minutes.   \par - started yoga\par - drink 2L water per day.\par - see MARCE Kim\par \par f/u in 6-8 weeks teb

## 2023-07-31 ENCOUNTER — APPOINTMENT (OUTPATIENT)
Dept: GASTROENTEROLOGY | Facility: CLINIC | Age: 53
End: 2023-07-31
Payer: COMMERCIAL

## 2023-07-31 VITALS
DIASTOLIC BLOOD PRESSURE: 84 MMHG | WEIGHT: 163 LBS | HEART RATE: 87 BPM | BODY MASS INDEX: 29.81 KG/M2 | SYSTOLIC BLOOD PRESSURE: 119 MMHG | TEMPERATURE: 97.6 F | OXYGEN SATURATION: 98 %

## 2023-07-31 DIAGNOSIS — R19.7 DIARRHEA, UNSPECIFIED: ICD-10-CM

## 2023-07-31 PROCEDURE — 99213 OFFICE O/P EST LOW 20 MIN: CPT

## 2023-08-04 NOTE — HISTORY OF PRESENT ILLNESS
[FreeTextEntry1] : Referred by Dr Ambar Lamas   The patient is a 52F, hx of thrombocytosis, rosacea, breast ca s/p radiation and on tamoxifen, obesity on wegovy, referred initially to GI for colon cancer screening in April 2022. Found to have ileitis and now referred for further workup.   Colonoscopy 7/2022 - nonbleeding internal hemorrhoids, diverticulosis in sigmoid colon and transverse colon, few diminutive ulcers in the TI  MRE Nov 2022 - short segment distal ileitis, no evidence of additional small or large bowel inflammation   Fecal calprotectin 241 (may 2023) and previously was 158 (nov 2022)   Patient reports she overall feels well but has had more frequent diarrhea over the last few weeks.  Bowel movement 3-4 x per day, usually soft  No blood in stool Denies abd pain, n/v, fevers, chills  No recent travel No recent abx  No fhx of IBD

## 2023-08-04 NOTE — ASSESSMENT
[FreeTextEntry1] : 52F, hx of thrombocytosis, rosacea, breast ca s/p radiation and on tamoxifen, obesity on wegovy, referred initially to GI for colon cancer screening in April 2022. Found to have ileitis and now referred for further workup.   Workup thus far --  - Colonoscopy 7/2022 - nonbleeding internal hemorrhoids, diverticulosis in sigmoid colon and transverse colon, few diminutive ulcers in the TI - MRE Nov 2022 - short segment distal ileitis, no evidence of additional small or large bowel inflammation  - Fecal calprotectin 241 (may 2023) and previously was 158 (nov 2022)  - CRP  <3 Apr 2023  - B12 202 May 2023    Plan -  - worsening diarrhea over last few weeks -- unclear if this is due to ileitis / crohns disease vs related to wegovy vs infectious etiology  - check stool studies today - schedule capsule endoscopy for full eval of small bowel  - long discussion re: terminal ielitis and Crohns disease. Will consider treatment pending above and pending clinical course   RTC after VCE

## 2023-08-25 ENCOUNTER — APPOINTMENT (OUTPATIENT)
Dept: GASTROENTEROLOGY | Facility: CLINIC | Age: 53
End: 2023-08-25
Payer: COMMERCIAL

## 2023-08-25 PROCEDURE — 91110 GI TRC IMG INTRAL ESOPH-ILE: CPT

## 2023-08-25 NOTE — ASSESSMENT
[FreeTextEntry1] : Reason for visit. Patient is being seen for a capsule procedure study.   Procedure note:   This is a pleasant 52-year-old female being seen for a procedure visit for small bowel capsule endoscopy to evaluate small bowel.   I have reviewed the risks, benefits, and alternatives of small bowel capsule endoscopy with the patient in detail. Risks include missed lesions, as well as capsule retention that could possibly result in bowel obstruction and necessitate surgical removal. Informed written consent was obtained prior to ingestion of the pill cam. The patient ingested the small bowel capsule without difficulty. She tolerated the procedure well without immediate complications. Real time video shows capsule in the stomach. The patient was instructed to contact the office with any questions or concerns.   Informed the patient that if she requires an MRI within 1 month that he will need an abdominal x-ray to confirm capsule exit.   Post capsule ingestion instructions were provided to the patient.

## 2023-08-28 ENCOUNTER — TRANSCRIPTION ENCOUNTER (OUTPATIENT)
Age: 53
End: 2023-08-28

## 2023-09-08 ENCOUNTER — OUTPATIENT (OUTPATIENT)
Dept: OUTPATIENT SERVICES | Facility: HOSPITAL | Age: 53
LOS: 1 days | Discharge: HOME | End: 2023-09-08

## 2023-09-08 DIAGNOSIS — Z90.49 ACQUIRED ABSENCE OF OTHER SPECIFIED PARTS OF DIGESTIVE TRACT: Chronic | ICD-10-CM

## 2023-09-08 DIAGNOSIS — C50.919 MALIGNANT NEOPLASM OF UNSPECIFIED SITE OF UNSPECIFIED FEMALE BREAST: ICD-10-CM

## 2023-09-08 DIAGNOSIS — Z98.890 OTHER SPECIFIED POSTPROCEDURAL STATES: Chronic | ICD-10-CM

## 2023-09-18 ENCOUNTER — TRANSCRIPTION ENCOUNTER (OUTPATIENT)
Age: 53
End: 2023-09-18

## 2023-09-18 ENCOUNTER — APPOINTMENT (OUTPATIENT)
Dept: HEMATOLOGY ONCOLOGY | Facility: CLINIC | Age: 53
End: 2023-09-18
Payer: COMMERCIAL

## 2023-09-18 VITALS
SYSTOLIC BLOOD PRESSURE: 145 MMHG | WEIGHT: 159.84 LBS | BODY MASS INDEX: 29.04 KG/M2 | HEIGHT: 62.01 IN | RESPIRATION RATE: 16 BRPM | DIASTOLIC BLOOD PRESSURE: 94 MMHG | TEMPERATURE: 98 F | OXYGEN SATURATION: 98 % | HEART RATE: 85 BPM

## 2023-09-18 DIAGNOSIS — Z79.810 LONG TERM (CURRENT) USE OF SELECTIVE ESTROGEN RECEPTOR MODULATORS (SERMS): ICD-10-CM

## 2023-09-18 PROCEDURE — 99214 OFFICE O/P EST MOD 30 MIN: CPT

## 2023-09-26 ENCOUNTER — TRANSCRIPTION ENCOUNTER (OUTPATIENT)
Age: 53
End: 2023-09-26

## 2023-10-03 ENCOUNTER — TRANSCRIPTION ENCOUNTER (OUTPATIENT)
Age: 53
End: 2023-10-03

## 2023-10-03 ENCOUNTER — NON-APPOINTMENT (OUTPATIENT)
Age: 53
End: 2023-10-03

## 2023-10-03 DIAGNOSIS — K29.60 OTHER GASTRITIS W/OUT BLEEDING: ICD-10-CM

## 2023-10-05 ENCOUNTER — OUTPATIENT (OUTPATIENT)
Dept: OUTPATIENT SERVICES | Facility: HOSPITAL | Age: 53
LOS: 1 days | End: 2023-10-05
Payer: COMMERCIAL

## 2023-10-05 ENCOUNTER — APPOINTMENT (OUTPATIENT)
Dept: BARIATRICS/WEIGHT MGMT | Facility: CLINIC | Age: 53
End: 2023-10-05
Payer: COMMERCIAL

## 2023-10-05 VITALS — BODY MASS INDEX: 28.89 KG/M2 | WEIGHT: 158 LBS

## 2023-10-05 DIAGNOSIS — E78.00 PURE HYPERCHOLESTEROLEMIA, UNSPECIFIED: ICD-10-CM

## 2023-10-05 DIAGNOSIS — E55.9 VITAMIN D DEFICIENCY, UNSPECIFIED: ICD-10-CM

## 2023-10-05 DIAGNOSIS — E66.01 MORBID (SEVERE) OBESITY DUE TO EXCESS CALORIES: ICD-10-CM

## 2023-10-05 DIAGNOSIS — R79.82 ELEVATED C-REACTIVE PROTEIN (CRP): ICD-10-CM

## 2023-10-05 DIAGNOSIS — Z98.890 OTHER SPECIFIED POSTPROCEDURAL STATES: Chronic | ICD-10-CM

## 2023-10-05 DIAGNOSIS — Z90.49 ACQUIRED ABSENCE OF OTHER SPECIFIED PARTS OF DIGESTIVE TRACT: Chronic | ICD-10-CM

## 2023-10-05 PROCEDURE — 99214 OFFICE O/P EST MOD 30 MIN: CPT | Mod: 95

## 2023-10-05 PROCEDURE — G0463: CPT

## 2023-10-06 DIAGNOSIS — I10 ESSENTIAL (PRIMARY) HYPERTENSION: ICD-10-CM

## 2023-10-22 ENCOUNTER — TRANSCRIPTION ENCOUNTER (OUTPATIENT)
Age: 53
End: 2023-10-22

## 2023-11-07 ENCOUNTER — NON-APPOINTMENT (OUTPATIENT)
Age: 53
End: 2023-11-07

## 2023-11-13 ENCOUNTER — APPOINTMENT (OUTPATIENT)
Age: 53
End: 2023-11-13
Payer: COMMERCIAL

## 2023-11-13 ENCOUNTER — RESULT REVIEW (OUTPATIENT)
Age: 53
End: 2023-11-13

## 2023-11-13 PROCEDURE — 43239 EGD BIOPSY SINGLE/MULTIPLE: CPT

## 2023-11-23 NOTE — END OF VISIT
[Time Spent: ___ minutes] : I have spent [unfilled] minutes of time on the encounter. Presented to ED c/o worsening headaches since 11/14 after vaginal delivery. Worsening today. Has h/o pre-eclampsia.

## 2023-11-24 ENCOUNTER — APPOINTMENT (OUTPATIENT)
Dept: MRI IMAGING | Facility: IMAGING CENTER | Age: 53
End: 2023-11-24
Payer: COMMERCIAL

## 2023-11-24 ENCOUNTER — RESULT REVIEW (OUTPATIENT)
Age: 53
End: 2023-11-24

## 2023-11-24 ENCOUNTER — OUTPATIENT (OUTPATIENT)
Dept: OUTPATIENT SERVICES | Facility: HOSPITAL | Age: 53
LOS: 1 days | End: 2023-11-24
Payer: COMMERCIAL

## 2023-11-24 ENCOUNTER — APPOINTMENT (OUTPATIENT)
Dept: RADIOLOGY | Facility: IMAGING CENTER | Age: 53
End: 2023-11-24
Payer: COMMERCIAL

## 2023-11-24 DIAGNOSIS — Z00.8 ENCOUNTER FOR OTHER GENERAL EXAMINATION: ICD-10-CM

## 2023-11-24 DIAGNOSIS — Z90.49 ACQUIRED ABSENCE OF OTHER SPECIFIED PARTS OF DIGESTIVE TRACT: Chronic | ICD-10-CM

## 2023-11-24 DIAGNOSIS — Z98.890 OTHER SPECIFIED POSTPROCEDURAL STATES: Chronic | ICD-10-CM

## 2023-11-24 PROCEDURE — 71045 X-RAY EXAM CHEST 1 VIEW: CPT | Mod: 26

## 2023-11-24 PROCEDURE — 74018 RADEX ABDOMEN 1 VIEW: CPT | Mod: 26

## 2023-11-24 PROCEDURE — 77049 MRI BREAST C-+ W/CAD BI: CPT | Mod: 26

## 2023-11-24 PROCEDURE — 74018 RADEX ABDOMEN 1 VIEW: CPT

## 2023-11-24 PROCEDURE — 71045 X-RAY EXAM CHEST 1 VIEW: CPT

## 2023-11-24 PROCEDURE — A9585: CPT

## 2023-11-24 PROCEDURE — C8908: CPT

## 2023-11-24 PROCEDURE — C8937: CPT

## 2023-11-28 ENCOUNTER — TRANSCRIPTION ENCOUNTER (OUTPATIENT)
Age: 53
End: 2023-11-28

## 2023-12-06 ENCOUNTER — TRANSCRIPTION ENCOUNTER (OUTPATIENT)
Age: 53
End: 2023-12-06

## 2023-12-07 ENCOUNTER — APPOINTMENT (OUTPATIENT)
Dept: BARIATRICS/WEIGHT MGMT | Facility: CLINIC | Age: 53
End: 2023-12-07
Payer: COMMERCIAL

## 2023-12-07 ENCOUNTER — OUTPATIENT (OUTPATIENT)
Dept: OUTPATIENT SERVICES | Facility: HOSPITAL | Age: 53
LOS: 1 days | End: 2023-12-07
Payer: COMMERCIAL

## 2023-12-07 VITALS — BODY MASS INDEX: 28.16 KG/M2 | WEIGHT: 154 LBS

## 2023-12-07 DIAGNOSIS — E66.01 MORBID (SEVERE) OBESITY DUE TO EXCESS CALORIES: ICD-10-CM

## 2023-12-07 DIAGNOSIS — Z90.49 ACQUIRED ABSENCE OF OTHER SPECIFIED PARTS OF DIGESTIVE TRACT: Chronic | ICD-10-CM

## 2023-12-07 DIAGNOSIS — R79.82 ELEVATED C-REACTIVE PROTEIN (CRP): ICD-10-CM

## 2023-12-07 DIAGNOSIS — E55.9 VITAMIN D DEFICIENCY, UNSPECIFIED: ICD-10-CM

## 2023-12-07 DIAGNOSIS — Z98.890 OTHER SPECIFIED POSTPROCEDURAL STATES: Chronic | ICD-10-CM

## 2023-12-07 DIAGNOSIS — E78.00 PURE HYPERCHOLESTEROLEMIA, UNSPECIFIED: ICD-10-CM

## 2023-12-07 PROCEDURE — G0463: CPT

## 2023-12-07 PROCEDURE — 99214 OFFICE O/P EST MOD 30 MIN: CPT | Mod: 95

## 2023-12-08 DIAGNOSIS — I10 ESSENTIAL (PRIMARY) HYPERTENSION: ICD-10-CM

## 2024-01-18 ENCOUNTER — APPOINTMENT (OUTPATIENT)
Dept: HEMATOLOGY ONCOLOGY | Facility: CLINIC | Age: 54
End: 2024-01-18

## 2024-01-22 ENCOUNTER — TRANSCRIPTION ENCOUNTER (OUTPATIENT)
Age: 54
End: 2024-01-22

## 2024-02-04 ENCOUNTER — OUTPATIENT (OUTPATIENT)
Dept: OUTPATIENT SERVICES | Facility: HOSPITAL | Age: 54
LOS: 1 days | Discharge: HOME | End: 2024-02-04

## 2024-02-04 DIAGNOSIS — Z98.890 OTHER SPECIFIED POSTPROCEDURAL STATES: Chronic | ICD-10-CM

## 2024-02-04 DIAGNOSIS — C50.919 MALIGNANT NEOPLASM OF UNSPECIFIED SITE OF UNSPECIFIED FEMALE BREAST: ICD-10-CM

## 2024-02-04 DIAGNOSIS — Z90.49 ACQUIRED ABSENCE OF OTHER SPECIFIED PARTS OF DIGESTIVE TRACT: Chronic | ICD-10-CM

## 2024-02-09 ENCOUNTER — APPOINTMENT (OUTPATIENT)
Dept: HEMATOLOGY ONCOLOGY | Facility: CLINIC | Age: 54
End: 2024-02-09
Payer: COMMERCIAL

## 2024-02-09 VITALS
WEIGHT: 153.22 LBS | OXYGEN SATURATION: 82 % | HEIGHT: 62.01 IN | RESPIRATION RATE: 16 BRPM | DIASTOLIC BLOOD PRESSURE: 90 MMHG | BODY MASS INDEX: 27.84 KG/M2 | HEART RATE: 82 BPM | TEMPERATURE: 98 F | SYSTOLIC BLOOD PRESSURE: 136 MMHG

## 2024-02-09 PROCEDURE — G2211 COMPLEX E/M VISIT ADD ON: CPT

## 2024-02-09 PROCEDURE — 99215 OFFICE O/P EST HI 40 MIN: CPT

## 2024-02-11 RX ORDER — TAMOXIFEN CITRATE 20 MG/1
20 TABLET, FILM COATED ORAL DAILY
Qty: 90 | Refills: 1 | Status: ACTIVE | COMMUNITY
Start: 2022-08-04 | End: 1900-01-01

## 2024-02-11 NOTE — ASSESSMENT
[FreeTextEntry1] : 54 yo woman with [id number] invasive ductal carcinoma of the right breast, ER+/GA+/er2 negative  - premenopausal at time of diagnosis and after lumpectomy and completion of XRT started on tamoxifen in 9/2022; had vaginal bleeding in 6/2023 until 8/2023 but has not had any bleeding since then - will evaluate hormonal status with labs (LH, FSH, Estradiol level); if post-menopausal, in 6 months when she has not had menstrual period for 12 months, will transition to AI for total of 5-7 years of endocrine therapy - will refill tamoxifen for now - encouraged follow-up with Gynecology especially if vaginal bleeding recurs - routine health maintenance labs (lipids, HbA1C, thyroid) with PMD on annual basis - f/u with Dr. Emmanuel annually; to schedule for annual mammo/sono in 5/2024 - encouraged baseline bone density testing given age >50 - Patient had the opportunity to have all their questions answered to their satisfaction - f/u in 6 months

## 2024-02-11 NOTE — REVIEW OF SYSTEMS
[Anxiety] : anxiety [Negative] : Endocrine [FreeTextEntry2] : as above intentional weight loss [FreeTextEntry7] : as above [de-identified] : as above

## 2024-02-11 NOTE — HISTORY OF PRESENT ILLNESS
[de-identified] : patient seen initially by Dr. Jean Claude Nazario at  [facility] in 2022.  She underwent routine mammogram on 05/28/2022, which showed scattered areas of fibroglandular density, and a subcentimeter rounded focal asymmetry in the lower outer right breast with associated calcifications, an additional asymmetry seen in the upper breast only on MLO views; a breast ultrasound performed on that same date demonstrated a focal asymmetry with calcifications in the lower inner right breast and asymmetry in the upper right breast with no mammographic or sonographic evidence of malignancy in the left breast; additional diagnostic breast imaging was recommended.    On 06/02/2022, she underwent a diagnostic mammogram of the right breast with a finding of a group of faint punctate calcifications in the lateral inferior right breast with associated density and on no sonographic correlate and ultrasound performed that same date.  The previously noted asymmetry in the upper right breast predominantly dispersed and was without any sonographic correlate.  The patient then went on to have a stereotactic core biopsy of the right breast lesion on 06/07/2022 with a finding of an invasive moderately differentiated ductal carcinoma, Novelty score 6/9, with invasive tumor measuring at least 0.3 centimeters, with evidence of DCIS, flat pattern with high nuclear grade, microcalcifications present in DCIS, no lymphovascular invasion noted, estrogen receptor positive (90%), progesterone receptor positive (90%) and HER-2/mikey negative (1+). She has surgical evaluation with Dr. Kavitha Emmanuel and went on to have a bilateral breast MRI on 06/14/2022 with a finding of a 2.2 centimeter post biopsy hematoma with associated enhancement in the lower outer posterior right breast corresponding to the site of prior biopsy-proven carcinoma; left breast, there was linear branching non mass enhancement in the upper inner mid breast spanning for up to 2.3 centimeters, but no axillary or internal mammary adenopathy seen.  The patient went on to have a MRI guided core biopsy of the left breast findings with evidence of a tiny intraductal papilloma, fibrocystic changes with florid duct hyperplasia and focal calcifications.    The patient subsequently underwent a right lumpectomy/sentinel lymph node biopsy on 06/28/2022 with a finding of an organizing biopsy cavity and fat necrosis, negative for any tumor; breast with flat epithelial atypia and sclerosing adenosis; 0/4 sentinel lymph nodes were involved with carcinoma.  The patient did well postoperatively and was seen on 7/13/22 in consultation with Dr. Nazario regarding further treatment recommendations.   Final staging T1a N0 Mx, stage IA breast cancer, noting the implications of her pathologic prognostic factors on the subsequent risk of developing both local and metastatic recurrence. She was evaluated by Dr. Mala Phillips for radiation oncology and completed RT to the right breast 4240 cGy 7/27/22- 8/17/22.  Given that the tumorwas less than 5 millimeters in size, the aggregate data for breast cancer was that there is no substantive further improvement in overall survival with use of any adjuvant intervention.  NCCN guidelines were reviewed with her and discussion was had to consider adjuvant antiestrogen therapy.  At the time of diagnosis she was premenopausal and adjuvant tamoxifen therapy was discussed including the potential risks, benefits, anticipated side effects. Further reduction risk of developing metastatic recurrence with adjuvant antiestrogen therapy such as tamoxifen in her specific setting was discussed along with the potential chemopreventive benefit of adjuvant antiestrogen therapy in this setting.  At that, she has also noted that she was quite anxious and shaken by her recent diagnosis.  In addition, she is an  working in a Cancer Center, and this must certainly be adding to her anxiety/adjustment to this diagnosis.  Consequently, she was offered consultation and potential for treatment with one of our psychologist-oncologists.  She started adjuvant tamoxifen on 9/29/22.  [de-identified] : 2/9/24 Patient is transferring her care from Dr. Nazario who has left [facility] to me today All of the patient's prior records including radiology, pathology and prior notes reviewed; Past Medical History, Past Surgical History, Family History and Social history reviewed and updated in the patient's chart. Patient returns today to rule out progression or recurrence of breast cancer and to assess treatment toxicity - on tamoxifen since 9/29/22  In June 2023, after not having her menstrual period for several months she had very heavy menstrual bleeding. She reports that she did see the gynecologist and was recommended to undergo transvaginal sonogram but did not follow through. After 2 months the bleeding resolved and she has not had any vaginal bleeding since 8/2023. She also has seen GI in 2023 for chronic ileitis and non-bleeding hemorrhoids; last colo - 7/2022 - non-bleeding internal hemorrhoids, diverticulosis of the sigmoid and transverse colon , few dimunitive ulcers in the terminal ileum  Remains on Wegovy for weight loss  Patient denies any SOB, CP, abdominal pain, bone pain, headache, or unexplained weight loss Patient denies any breast masses,  skin changes or nipple discharge. Patient denies any hotflashes, arthralgias, vaginal dryness, vaginal bleeding, hair loss, muscle cramps.  B/L mammo/sono 5/23 - neg BMD - has not had Breast Surgeon - Dr. Kavitha Emmanuel; sees annually GYN - Last seen in 6/2023 PMD - Dr. Evelina Palomino GI - Dr. Harrison Spencer- last colo 7/2022, EGD 11/2023 - negative gastrica nad duodenal biopsy; Capsule endo 11/2023 [100: Normal, no complaints, no evidence of disease.] : 100: Normal, no complaints, no evidence of disease.

## 2024-02-11 NOTE — PHYSICAL EXAM
[Fully active, able to carry on all pre-disease performance without restriction] : Status 0 - Fully active, able to carry on all pre-disease performance without restriction [Normal] : affect appropriate [de-identified] :  right breast is status post a lumpectomy with well-healed circumareolar scar; there is no nipple retraction, skin dimpling, or palpable masses noted; left breast is without nipple retraction, skin dimpling, or palpable masses.  Bilateral axillae are without adenopathy.

## 2024-02-12 ENCOUNTER — NON-APPOINTMENT (OUTPATIENT)
Age: 54
End: 2024-02-12

## 2024-02-12 ENCOUNTER — APPOINTMENT (OUTPATIENT)
Dept: GASTROENTEROLOGY | Facility: CLINIC | Age: 54
End: 2024-02-12
Payer: COMMERCIAL

## 2024-02-12 DIAGNOSIS — K44.9 DIAPHRAGMATIC HERNIA W/OUT OBSTRUCTION OR GANGRENE: ICD-10-CM

## 2024-02-12 DIAGNOSIS — R10.9 UNSPECIFIED ABDOMINAL PAIN: ICD-10-CM

## 2024-02-12 DIAGNOSIS — R19.8 OTHER SPECIFIED SYMPTOMS AND SIGNS INVOLVING THE DIGESTIVE SYSTEM AND ABDOMEN: ICD-10-CM

## 2024-02-12 DIAGNOSIS — K52.9 NONINFECTIVE GASTROENTERITIS AND COLITIS, UNSPECIFIED: ICD-10-CM

## 2024-02-12 PROCEDURE — 99213 OFFICE O/P EST LOW 20 MIN: CPT

## 2024-02-13 ENCOUNTER — TRANSCRIPTION ENCOUNTER (OUTPATIENT)
Age: 54
End: 2024-02-13

## 2024-02-14 ENCOUNTER — TRANSCRIPTION ENCOUNTER (OUTPATIENT)
Age: 54
End: 2024-02-14

## 2024-02-27 LAB
BASOPHILS # BLD AUTO: 0.06 K/UL
BASOPHILS NFR BLD AUTO: 0.9 %
EOSINOPHIL # BLD AUTO: 0.1 K/UL
EOSINOPHIL NFR BLD AUTO: 1.5 %
HCT VFR BLD CALC: 42.5 %
HGB BLD-MCNC: 13.7 G/DL
IMM GRANULOCYTES NFR BLD AUTO: 0.5 %
LYMPHOCYTES # BLD AUTO: 1.17 K/UL
LYMPHOCYTES NFR BLD AUTO: 17.7 %
MAN DIFF?: NORMAL
MCHC RBC-ENTMCNC: 30.2 PG
MCHC RBC-ENTMCNC: 32.2 GM/DL
MCV RBC AUTO: 93.6 FL
MONOCYTES # BLD AUTO: 0.38 K/UL
MONOCYTES NFR BLD AUTO: 5.7 %
NEUTROPHILS # BLD AUTO: 4.87 K/UL
NEUTROPHILS NFR BLD AUTO: 73.7 %
PLATELET # BLD AUTO: 361 K/UL
RBC # BLD: 4.54 M/UL
RBC # FLD: 11.9 %
WBC # FLD AUTO: 6.61 K/UL

## 2024-02-28 PROBLEM — K44.9 HIATAL HERNIA: Status: ACTIVE | Noted: 2023-11-15

## 2024-02-28 PROBLEM — R10.9 ABDOMINAL DISCOMFORT: Status: ACTIVE | Noted: 2024-02-28

## 2024-02-28 PROBLEM — R19.8 IRREGULAR BOWEL HABITS: Status: RESOLVED | Noted: 2024-02-28 | Resolved: 2024-02-28

## 2024-02-28 PROBLEM — K52.9 ILEITIS: Status: ACTIVE | Noted: 2022-07-27

## 2024-02-28 LAB
ALBUMIN SERPL ELPH-MCNC: 4.1 G/DL
ALP BLD-CCNC: 57 U/L
ALT SERPL-CCNC: 11 U/L
ANION GAP SERPL CALC-SCNC: 16 MMOL/L
AST SERPL-CCNC: 16 U/L
BILIRUB SERPL-MCNC: 0.2 MG/DL
BUN SERPL-MCNC: 13 MG/DL
CALCIUM SERPL-MCNC: 9.2 MG/DL
CHLORIDE SERPL-SCNC: 108 MMOL/L
CO2 SERPL-SCNC: 19 MMOL/L
CREAT SERPL-MCNC: 0.92 MG/DL
EGFR: 75 ML/MIN/1.73M2
FSH SERPL-MCNC: 24.3 IU/L
GLUCOSE SERPL-MCNC: 102 MG/DL
LH SERPL-ACNC: 29.5 IU/L
POTASSIUM SERPL-SCNC: 4.5 MMOL/L
PROT SERPL-MCNC: 7.2 G/DL
SODIUM SERPL-SCNC: 143 MMOL/L

## 2024-02-28 NOTE — HISTORY OF PRESENT ILLNESS
[Home] : at home, [unfilled] , at the time of the visit. [Medical Office: ([facility])___] : at the medical office located in  [Verbal consent obtained from patient] : the patient, [unfilled] [FreeTextEntry1] : Scheduled for follow up telehealth visit today  Last seen for endoscopy in November 2023  EGD  Nov 2023 - 3cm HH, erythema and erososions in stomach, normal duodenum. Path - chronic inactive gastritis, Negative for H pylori, IM and celiac disease   Has been doing well overall  Bowel movements vary between constipation and diarrhea --- more often constipation  Occasional diarrhea but usually related to stress  No blood in stool   Occasional upper abdominal burning pain  Occurs if eating heavy or fatty foods  Resolves w OTC antacid   On wegovy  no rashes, joint pain, eye pain  [Time Spent: ___ minutes] : I have spent [unfilled] minutes with the patient on the telephone

## 2024-02-28 NOTE — PLAN
[FreeTextEntry1] : 53F, hx of thrombocytosis, rosacea, breast ca s/p radiation and on tamoxifen, obesity on wegovy, referred initially to GI for colon cancer screening in April 2022. Found to have ileitis and now referred for further workup.  Workup thus far -- EGD Nov 2023 - 3cm HH, erythema and erososions in stomach, normal duodenum. Path - chronic inactive gastritis, Negative for H pylori, IM and celiac disease - Colonoscopy 7/2022 - nonbleeding internal hemorrhoids, diverticulosis in sigmoid colon and transverse colon, few diminutive ulcers in the TI - MRE Nov 2022 - short segment distal ileitis, no evidence of additional small or large bowel inflammation - Fecal calprotectin 241 (may 2023) and previously was 158 (nov 2022) - CRP <3 Apr 2023 - B12 202 May 2023  # Ileitis  # Alternating diarrhea and constipation  - Workup as above  - long discussion re: terminal ileitis and low rate of progression to Crohns Disease in asymptomatic patients - unclear that constipation and diarrhea in stress is true symptoms related to ileitis  - start fiber supplement - check fecal sherman and crp - if elevated, will consider repeat cscope   # Abd discomfort (burning, epigastric)  # HH  - EGD as above - prn h2 blocker  - lifestyle modifications for GERD    RTC after blood and stool tests

## 2024-03-01 LAB — CRP SERPL-MCNC: <3 MG/L

## 2024-03-05 LAB — ESTRADIOL ULTRASENSITIVE: 251.9 PG/ML

## 2024-03-06 ENCOUNTER — APPOINTMENT (OUTPATIENT)
Dept: BARIATRICS/WEIGHT MGMT | Facility: CLINIC | Age: 54
End: 2024-03-06
Payer: COMMERCIAL

## 2024-03-06 ENCOUNTER — OUTPATIENT (OUTPATIENT)
Dept: OUTPATIENT SERVICES | Facility: HOSPITAL | Age: 54
LOS: 1 days | End: 2024-03-06
Payer: COMMERCIAL

## 2024-03-06 VITALS — BODY MASS INDEX: 27.43 KG/M2 | WEIGHT: 150 LBS

## 2024-03-06 DIAGNOSIS — Z98.890 OTHER SPECIFIED POSTPROCEDURAL STATES: Chronic | ICD-10-CM

## 2024-03-06 DIAGNOSIS — Z90.49 ACQUIRED ABSENCE OF OTHER SPECIFIED PARTS OF DIGESTIVE TRACT: Chronic | ICD-10-CM

## 2024-03-06 DIAGNOSIS — I10 ESSENTIAL (PRIMARY) HYPERTENSION: ICD-10-CM

## 2024-03-06 PROCEDURE — 99214 OFFICE O/P EST MOD 30 MIN: CPT | Mod: 95

## 2024-03-06 PROCEDURE — G0463: CPT

## 2024-03-06 RX ORDER — SEMAGLUTIDE 2.4 MG/.75ML
2.4 INJECTION, SOLUTION SUBCUTANEOUS
Qty: 3 | Refills: 1 | Status: ACTIVE | COMMUNITY
Start: 2023-06-08 | End: 1900-01-01

## 2024-03-06 NOTE — HISTORY OF PRESENT ILLNESS
[FreeTextEntry1] : Bariatric surgery history: none Obesity co-morbidities: HLD, vit d def, GERD Comorbidities improved or resolved: none Anti-obesity medications: Wegovy Obesity medication side effects: none  Ms. LUIS SIEGEL is a 52 year year old female who presents for evaluation and treatment of Class 3 obesity, now Class 1 obesity now overweight bmi.   +Does have an IUD unsure of menopause status Vit D deficiency, mild GERD - only OTCs  Today, assuming care of this pt - down 60 lbs over past 2 years, now 150 lbs- working w/ RD, on medication - doing well on wegovy 2.4mg - enrolled in [facility] program - stays focus on goal - had been diagnosed w/ Breast cancer, s/p RT, now on tamoxifen  - recently on a trip to [place]- walked 20k steps/day -  decr appetite, eating healthier including going out to dinner.  about the same.  no snacking.  - seeing GI, everything clear, recent c-scope, EGD - Reflux has improved w/ WL- takes pepcid as needed.  - keeping up w/ water intake - PA- walking, active at work, will be doing some biking - Food- 3 meals/day, BF- yogurt/granola/fruit/ ON oats, L- salad, on WE has eggs/toast, D- beef/chicken/fish, sometimes veggie meal.   Weight History: Lowest adult weight: 130 Highest adult weight: 215

## 2024-03-06 NOTE — ASSESSMENT
[FreeTextEntry1] : 53 y.o female w Class 3 obesity now Class 1 obesity now overweight bmi, elev crp, elev LDL, vit d def, presents for weight management  - continue Wegovy 24.mg, will renew, down to 150 lbs, from 215#, >20% body weight. doing well overall - doing really well overall. would like to lose another ~10 lbs - will check labs next visit-  incl A1c, fasting insulin, CRP - PA- increase as tolerated, cardio, can add resistance 2x/wk - continue w/ BF/L/D, no snacking,  - advised to have WF/PB protein over animal products - frontload calories - drink 2L water per day. - c/w RDN as needed   f/u in 2-3months

## 2024-03-19 DIAGNOSIS — E66.01 MORBID (SEVERE) OBESITY DUE TO EXCESS CALORIES: ICD-10-CM

## 2024-04-16 ENCOUNTER — OUTPATIENT (OUTPATIENT)
Dept: OUTPATIENT SERVICES | Facility: HOSPITAL | Age: 54
LOS: 1 days | End: 2024-04-16
Payer: COMMERCIAL

## 2024-04-16 ENCOUNTER — APPOINTMENT (OUTPATIENT)
Dept: INTERNAL MEDICINE | Facility: CLINIC | Age: 54
End: 2024-04-16
Payer: COMMERCIAL

## 2024-04-16 VITALS
BODY MASS INDEX: 27.62 KG/M2 | HEART RATE: 72 BPM | OXYGEN SATURATION: 98 % | HEIGHT: 62.25 IN | SYSTOLIC BLOOD PRESSURE: 118 MMHG | DIASTOLIC BLOOD PRESSURE: 70 MMHG | WEIGHT: 152 LBS

## 2024-04-16 DIAGNOSIS — E66.01 MORBID (SEVERE) OBESITY DUE TO EXCESS CALORIES: ICD-10-CM

## 2024-04-16 DIAGNOSIS — C50.911 MALIGNANT NEOPLASM OF UNSPECIFIED SITE OF RIGHT FEMALE BREAST: ICD-10-CM

## 2024-04-16 DIAGNOSIS — Z90.49 ACQUIRED ABSENCE OF OTHER SPECIFIED PARTS OF DIGESTIVE TRACT: Chronic | ICD-10-CM

## 2024-04-16 DIAGNOSIS — F41.1 GENERALIZED ANXIETY DISORDER: ICD-10-CM

## 2024-04-16 DIAGNOSIS — E55.9 VITAMIN D DEFICIENCY, UNSPECIFIED: ICD-10-CM

## 2024-04-16 DIAGNOSIS — I10 ESSENTIAL (PRIMARY) HYPERTENSION: ICD-10-CM

## 2024-04-16 DIAGNOSIS — E78.00 PURE HYPERCHOLESTEROLEMIA, UNSPECIFIED: ICD-10-CM

## 2024-04-16 DIAGNOSIS — Z98.890 OTHER SPECIFIED POSTPROCEDURAL STATES: Chronic | ICD-10-CM

## 2024-04-16 DIAGNOSIS — C80.1 GENERALIZED ANXIETY DISORDER: ICD-10-CM

## 2024-04-16 DIAGNOSIS — Z17.0 MALIGNANT NEOPLASM OF UNSPECIFIED SITE OF RIGHT FEMALE BREAST: ICD-10-CM

## 2024-04-16 DIAGNOSIS — E53.8 DEFICIENCY OF OTHER SPECIFIED B GROUP VITAMINS: ICD-10-CM

## 2024-04-16 DIAGNOSIS — Z00.00 ENCOUNTER FOR GENERAL ADULT MEDICAL EXAMINATION W/OUT ABNORMAL FINDINGS: ICD-10-CM

## 2024-04-16 PROCEDURE — G0463: CPT

## 2024-04-16 PROCEDURE — 99396 PREV VISIT EST AGE 40-64: CPT

## 2024-04-16 RX ORDER — FAMOTIDINE 40 MG/1
TABLET, FILM COATED ORAL
Refills: 0 | Status: ACTIVE | COMMUNITY

## 2024-04-17 PROBLEM — F41.1 ANXIETY ASSOCIATED WITH CANCER DIAGNOSIS: Status: RESOLVED | Noted: 2023-01-05 | Resolved: 2024-04-17

## 2024-04-17 PROBLEM — C50.911 MALIGNANT NEOPLASM OF RIGHT BREAST IN FEMALE, ESTROGEN RECEPTOR POSITIVE, UNSPECIFIED SITE OF BREAST: Status: ACTIVE | Noted: 2022-07-14

## 2024-04-17 RX ORDER — PSYLLIUM HUSK 0.4 G
CAPSULE ORAL
Refills: 0 | Status: ACTIVE | COMMUNITY

## 2024-04-23 DIAGNOSIS — E53.8 DEFICIENCY OF OTHER SPECIFIED B GROUP VITAMINS: ICD-10-CM

## 2024-04-23 DIAGNOSIS — E66.01 MORBID (SEVERE) OBESITY DUE TO EXCESS CALORIES: ICD-10-CM

## 2024-04-23 DIAGNOSIS — Z17.0 ESTROGEN RECEPTOR POSITIVE STATUS [ER+]: ICD-10-CM

## 2024-04-23 DIAGNOSIS — C50.911 MALIGNANT NEOPLASM OF UNSPECIFIED SITE OF RIGHT FEMALE BREAST: ICD-10-CM

## 2024-04-23 DIAGNOSIS — E78.00 PURE HYPERCHOLESTEROLEMIA, UNSPECIFIED: ICD-10-CM

## 2024-04-23 DIAGNOSIS — Z00.00 ENCOUNTER FOR GENERAL ADULT MEDICAL EXAMINATION WITHOUT ABNORMAL FINDINGS: ICD-10-CM

## 2024-04-23 DIAGNOSIS — E55.9 VITAMIN D DEFICIENCY, UNSPECIFIED: ICD-10-CM

## 2024-05-29 ENCOUNTER — APPOINTMENT (OUTPATIENT)
Dept: SURGERY | Facility: CLINIC | Age: 54
End: 2024-05-29
Payer: COMMERCIAL

## 2024-05-29 PROCEDURE — 99213K: CUSTOM

## 2024-05-31 LAB
25(OH)D3 SERPL-MCNC: 12.1 NG/ML
CHOLEST SERPL-MCNC: 161 MG/DL
ESTIMATED AVERAGE GLUCOSE: 103 MG/DL
HBA1C MFR BLD HPLC: 5.2 %
HCV AB SER QL: NONREACTIVE
HCV S/CO RATIO: 0.11 S/CO
HDLC SERPL-MCNC: 59 MG/DL
LDLC SERPL CALC-MCNC: 78 MG/DL
NONHDLC SERPL-MCNC: 102 MG/DL
TRIGL SERPL-MCNC: 141 MG/DL
VIT B12 SERPL-MCNC: 285 PG/ML

## 2024-05-31 NOTE — DISCUSSION/SUMMARY
[FreeTextEntry1] : 49 year woman with a history as listed presents for an initial cardiac evaluation. \par Raquel is complain of atypical chest pain that is most likely related to stress. There could be a GERD component as well. Trial of Maalox and PPI advised. Her EKG has nonspecific changes. She will undergo a treadmill exercise stress test to define exercise tolerance, rule out exertional hypertensive responses, assess for exercise induced arrhythmias and rule out ischemia from obstructive CAD. She will get a 2d echo to assess for any  new structural heart disease, changes in valvular and ventricular function. \par Her blood pressure is elevated today. She has had labile readings in the past. I will reassess it on her testing. Most likely she will need antihypertensive therapy.\par Her last set of lipids are controlled.  \par Exercise and diet counseling was performed in order to reduce her future cardiovascular risk.\par She will followup with me in 6-12 months or sooner if necessary. 
Never smoker

## 2024-05-31 NOTE — HISTORY OF PRESENT ILLNESS
[FreeTextEntry1] : physical [de-identified] : 54 yo female with h/o as below here for CPE. Feeling well overall. Under care of weight management.  On wegovy 2.4/week, maintenance dose, has lost 60 lbs overall.  Eating right, working with nutritionist. Not eating when stressed, finds other outlets.  Walked a lot in [place] and [place] in January. Seeing oncologist Dr. Lofton.  Having more hot flashes, not sure if tamoxifen or if perimenopausal. Had breast MRI in November, will f/up with breast surgeon Dr. Emmanuel and then will get rx mammo for May. Will see gyn in June/July. Had EGD done and found some inflammation, had colonoscopy 7/22 when found ulcers. Advised to have metamucil daily to help with more consistent BM, pepcid as needed, really careful with what she is eating. Hasn't yet had shingrix, did have covid booster in the fall.

## 2024-05-31 NOTE — PAST MEDICAL HISTORY
[Perimenopausal] : hx of being perimenopausal [Less Bleeding] : the period was lighter than normal [Irregular Cycle Intervals] : are  irregular [Total Preg ___] : G: [unfilled] [Full Term ___] : Full Term: [unfilled]  [FreeTextEntry1] : LMP June 2023; no uterine or ovarian abnl, no abnl paps, no h/o STDs, sexually active with 1 partner in past year, gyn Dr. Hurt

## 2024-05-31 NOTE — REVIEW OF SYSTEMS
[Recent Change In Weight] : ~T recent weight change [Constipation] : constipation [Diarrhea] : diarrhea [Heartburn] : heartburn [Headache] : headache [Negative] : Musculoskeletal [Fever] : no fever [Chills] : no chills [Fatigue] : no fatigue [Chest Pain] : no chest pain [Palpitations] : no palpitations [Shortness Of Breath] : no shortness of breath [Cough] : no cough [Dyspnea on Exertion] : no dyspnea on exertion [Abdominal Pain] : no abdominal pain [Nausea] : no nausea [Vomiting] : no vomiting [Melena] : no melena [Dysuria] : no dysuria [Vaginal Discharge] : no vaginal discharge [Skin Rash] : no skin rash [Dizziness] : no dizziness [Fainting] : no fainting [Anxiety] : no anxiety [Depression] : no depression [Easy Bleeding] : no easy bleeding [Easy Bruising] : no easy bruising [FreeTextEntry2] : continued weight loss/weight maintenance with wegovy; walking for exercise [FreeTextEntry3] : sees optho [FreeTextEntry7] : intermittent diarrhea and constipation [de-identified] : rare HA, triggered by stress, no paresthesias

## 2024-05-31 NOTE — ASSESSMENT
[FreeTextEntry1] : 52 yo female with h/o as above including breast cancer s/p lumpectomy and radiation and tamoxifen, obesity, ileitis, GERD, vitamin b12 deficiency, here for CPE. 1.  CV - bp at goal, check lipids; suggested to consult with cardio-oncologist given h/o chest radiation 2.  Gyn - following with oncologist for breast cancer and on tamoxifen; mammo/breast MRI utd, pap utd 3.  GI - colonoscopy utd and following with gI for ileitis seen on prior colonoscopy 4.  Heme - vitamin b12 deficiency on prior labs, recheck b12 5.  Endo - working with nutritionist and weight stable on wegovy, check a1c; check vitamin d for deficiency; tsh nl last year 6.  HCM - check below labs, had recent cbc and cmp; consider shingrix, other vaccines utd 7.  RTO prn or 1 year

## 2024-05-31 NOTE — HEALTH RISK ASSESSMENT
[0] : 2) Feeling down, depressed, or hopeless: Not at all (0) [Patient reported mammogram was normal] : Patient reported mammogram was normal [Patient reported PAP Smear was normal] : Patient reported PAP Smear was normal [Patient reported colonoscopy was normal] : Patient reported colonoscopy was normal [Never] : Never [MammogramDate] : 05/23 [PapSmearDate] : 06/23 [ColonoscopyDate] : 07/22 [ColonoscopyComments] : ?due for repeat

## 2024-05-31 NOTE — ADDENDUM
[FreeTextEntry1] : 5/31/24:  Reviewed labs with pt, nl except vitamin d 12 c/w insufficiency to take 2000 Iu/day, b12 285 low nl (hasn't been as consistent with b12 supplements so will be more consistent but would advise following up level and if still low after supplements might consider injections instead, advised can get level checked with heme if needed), other labs nl.

## 2024-05-31 NOTE — PHYSICAL EXAM
[No Acute Distress] : no acute distress [Well Nourished] : well nourished [Well Developed] : well developed [Well-Appearing] : well-appearing [PERRL] : pupils equal round and reactive to light [EOMI] : extraocular movements intact [Normal Oropharynx] : the oropharynx was normal [Normal TMs] : both tympanic membranes were normal [No Lymphadenopathy] : no lymphadenopathy [Supple] : supple [Thyroid Normal, No Nodules] : the thyroid was normal and there were no nodules present [No Respiratory Distress] : no respiratory distress  [No Accessory Muscle Use] : no accessory muscle use [Clear to Auscultation] : lungs were clear to auscultation bilaterally [Normal Rate] : normal rate  [Regular Rhythm] : with a regular rhythm [Normal S1, S2] : normal S1 and S2 [No Murmur] : no murmur heard [No Carotid Bruits] : no carotid bruits [Pedal Pulses Present] : the pedal pulses are present [No Edema] : there was no peripheral edema [Normal Appearance] : normal in appearance [No Nipple Discharge] : no nipple discharge [No Axillary Lymphadenopathy] : no axillary lymphadenopathy [Soft] : abdomen soft [Non Tender] : non-tender [Non-distended] : non-distended [No Masses] : no abdominal mass palpated [No HSM] : no HSM [Normal Bowel Sounds] : normal bowel sounds [Normal Supraclavicular Nodes] : no supraclavicular lymphadenopathy [Normal Axillary Nodes] : no axillary lymphadenopathy [Normal Posterior Cervical Nodes] : no posterior cervical lymphadenopathy [Normal Anterior Cervical Nodes] : no anterior cervical lymphadenopathy [Normal Inguinal Nodes] : no inguinal lymphadenopathy [No Joint Swelling] : no joint swelling [No Rash] : no rash [Normal Gait] : normal gait [Normal Affect] : the affect was normal [de-identified] : scar right areola from prior lumpectomy

## 2024-06-11 ENCOUNTER — TRANSCRIPTION ENCOUNTER (OUTPATIENT)
Age: 54
End: 2024-06-11

## 2024-06-26 ENCOUNTER — APPOINTMENT (OUTPATIENT)
Dept: BARIATRICS/WEIGHT MGMT | Facility: CLINIC | Age: 54
End: 2024-06-26

## 2024-06-28 ENCOUNTER — APPOINTMENT (OUTPATIENT)
Dept: MAMMOGRAPHY | Facility: CLINIC | Age: 54
End: 2024-06-28
Payer: COMMERCIAL

## 2024-06-28 ENCOUNTER — APPOINTMENT (OUTPATIENT)
Dept: ULTRASOUND IMAGING | Facility: CLINIC | Age: 54
End: 2024-06-28
Payer: COMMERCIAL

## 2024-06-28 ENCOUNTER — OUTPATIENT (OUTPATIENT)
Dept: OUTPATIENT SERVICES | Facility: HOSPITAL | Age: 54
LOS: 1 days | End: 2024-06-28
Payer: COMMERCIAL

## 2024-06-28 DIAGNOSIS — Z98.890 OTHER SPECIFIED POSTPROCEDURAL STATES: Chronic | ICD-10-CM

## 2024-06-28 DIAGNOSIS — Z00.00 ENCOUNTER FOR GENERAL ADULT MEDICAL EXAMINATION WITHOUT ABNORMAL FINDINGS: ICD-10-CM

## 2024-06-28 DIAGNOSIS — Z90.49 ACQUIRED ABSENCE OF OTHER SPECIFIED PARTS OF DIGESTIVE TRACT: Chronic | ICD-10-CM

## 2024-06-28 PROCEDURE — 76641 ULTRASOUND BREAST COMPLETE: CPT | Mod: 26,50

## 2024-06-28 PROCEDURE — 77066 DX MAMMO INCL CAD BI: CPT | Mod: 26

## 2024-06-28 PROCEDURE — G0279: CPT | Mod: 26

## 2024-06-28 PROCEDURE — 76641 ULTRASOUND BREAST COMPLETE: CPT

## 2024-06-28 PROCEDURE — 77066 DX MAMMO INCL CAD BI: CPT

## 2024-06-28 PROCEDURE — G0279: CPT

## 2024-07-26 ENCOUNTER — OUTPATIENT (OUTPATIENT)
Dept: OUTPATIENT SERVICES | Facility: HOSPITAL | Age: 54
LOS: 1 days | Discharge: HOME | End: 2024-07-26

## 2024-07-26 DIAGNOSIS — C50.919 MALIGNANT NEOPLASM OF UNSPECIFIED SITE OF UNSPECIFIED FEMALE BREAST: ICD-10-CM

## 2024-08-02 ENCOUNTER — APPOINTMENT (OUTPATIENT)
Dept: HEMATOLOGY ONCOLOGY | Facility: CLINIC | Age: 54
End: 2024-08-02
Payer: COMMERCIAL

## 2024-08-02 VITALS
DIASTOLIC BLOOD PRESSURE: 86 MMHG | OXYGEN SATURATION: 99 % | SYSTOLIC BLOOD PRESSURE: 134 MMHG | HEART RATE: 67 BPM | WEIGHT: 148.81 LBS | RESPIRATION RATE: 16 BRPM | TEMPERATURE: 98.1 F | BODY MASS INDEX: 27 KG/M2

## 2024-08-02 DIAGNOSIS — Z17.0 MALIGNANT NEOPLASM OF UNSPECIFIED SITE OF RIGHT FEMALE BREAST: ICD-10-CM

## 2024-08-02 DIAGNOSIS — C50.911 MALIGNANT NEOPLASM OF UNSPECIFIED SITE OF RIGHT FEMALE BREAST: ICD-10-CM

## 2024-08-02 PROCEDURE — G2211 COMPLEX E/M VISIT ADD ON: CPT

## 2024-08-02 PROCEDURE — 99214 OFFICE O/P EST MOD 30 MIN: CPT

## 2024-08-03 NOTE — HISTORY OF PRESENT ILLNESS
[100: Normal, no complaints, no evidence of disease.] : 100: Normal, no complaints, no evidence of disease. [de-identified] : Patient seen initially by Dr. Jean Claude Nazario at  [facility] (formerly [facility] in 2022.  She underwent routine mammogram on 05/28/2022, which showed scattered areas of fibroglandular density, and a subcentimeter rounded focal asymmetry in the lower outer right breast with associated calcifications, an additional asymmetry seen in the upper breast only on MLO views; a breast ultrasound performed on that same date demonstrated a focal asymmetry with calcifications in the lower inner right breast and asymmetry in the upper right breast with no mammographic or sonographic evidence of malignancy in the left breast; additional diagnostic breast imaging was recommended.    On 06/02/2022, she underwent a diagnostic mammogram of the right breast with a finding of a group of faint punctate calcifications in the lateral inferior right breast with associated density and on no sonographic correlate and ultrasound performed that same date.  The previously noted asymmetry in the upper right breast predominantly dispersed and was without any sonographic correlate.  The patient then went on to have a stereotactic core biopsy of the right breast lesion on 06/07/2022 with a finding of an invasive moderately differentiated ductal carcinoma, [place] score 6/9, with invasive tumor measuring at least 0.3 centimeters, with evidence of DCIS, flat pattern with high nuclear grade, microcalcifications present in DCIS, no lymphovascular invasion noted, estrogen receptor positive (90%), progesterone receptor positive (90%) and HER-2/mikey negative (1+). She has surgical evaluation with Dr. Kavitha Emmanuel and went on to have a bilateral breast MRI on 06/14/2022 with a finding of a 2.2 centimeter post biopsy hematoma with associated enhancement in the lower outer posterior right breast corresponding to the site of prior biopsy-proven carcinoma; left breast, there was linear branching non mass enhancement in the upper inner mid breast spanning for up to 2.3 centimeters, but no axillary or internal mammary adenopathy seen.  The patient went on to have a MRI guided core biopsy of the left breast findings with evidence of a tiny intraductal papilloma, fibrocystic changes with florid duct hyperplasia and focal calcifications.    The patient subsequently underwent a right lumpectomy/sentinel lymph node biopsy on 06/28/2022 with a finding of an organizing biopsy cavity and fat necrosis, negative for any tumor; breast with flat epithelial atypia and sclerosing adenosis; 0/4 sentinel lymph nodes were involved with carcinoma.  The patient did well postoperatively and was seen on 7/13/22 in consultation with Dr. Nazario regarding further treatment recommendations.   Final staging T1a N0 Mx, stage IA breast cancer, noting the implications of her pathologic prognostic factors on the subsequent risk of developing both local and metastatic recurrence. She was evaluated by Dr. Mala Phillips for radiation oncology and completed RT to the right breast 4240 cGy 7/27/22- 8/17/22.  Given that the tumor was less than 5 millimeters in size, the aggregate data for breast cancer was that there is no substantive further improvement in overall survival with use of any adjuvant intervention.  NCCN guidelines were reviewed with her and discussion was had to consider adjuvant antiestrogen therapy.  At the time of diagnosis she was premenopausal and adjuvant tamoxifen therapy was discussed including the potential risks, benefits, anticipated side effects. Further reduction risk of developing metastatic recurrence with adjuvant antiestrogen therapy such as tamoxifen in her specific setting was discussed along with the potential chemopreventive benefit of adjuvant antiestrogen therapy in this setting.  At that, she has also noted that she was quite anxious and shaken by her recent diagnosis.  In addition, she is an  working in a Cancer Center, and this must certainly be adding to her anxiety/adjustment to this diagnosis.  Consequently, she was offered consultation and potential for treatment with one of our psychologist-oncologists.  She started adjuvant tamoxifen on 9/29/22. Transferred Care from Dr. Nazario to me (Dr. Lofton) in 2/2024  [de-identified] : 8/2/24 Patient returns today to rule out progression or recurrence of breast cancer and to assess treatment toxicity.  In June 2023, after not having her menstrual period for several months she had very heavy menstrual bleeding. She reports that she did see the gynecologist and was recommended to undergo transvaginal sonogram but did not follow through. After 2 months the bleeding resolved and she has not had any vaginal bleeding since 8/2023. She also has seen GI in 2023 for chronic ileitis and non-bleeding hemorrhoids; last colo - 7/2022 - non-bleeding internal hemorrhoids, diverticulosis of the sigmoid and transverse colon , few dimunitive ulcers in the terminal ileum  Remains on Wegovy for weight loss  Patient denies any SOB, CP, abdominal pain, bone pain, headache, or unexplained weight loss Patient denies any breast masses,  skin changes or nipple discharge. Patient denies any hotflashes, arthralgias, vaginal dryness, vaginal bleeding, hair loss, muscle cramps.  B/L mammo/sono 6/2024 (reviewed in PACS) BMD - has not had Breast Surgeon - Dr. Kavitha Emmanuel; sees annually GYN - Last seen in 6/2023 PMD - Dr. Evelina Palomino GI - Dr. Harrison Spencer- last colo 7/2022, EGD 11/2023 - negative gastric and duodenal biopsy; Capsule endo 11/2023

## 2024-08-03 NOTE — REVIEW OF SYSTEMS
[Anxiety] : anxiety [de-identified] : as above [Negative] : Psychiatric [FreeTextEntry2] : as above intentional weight loss [FreeTextEntry7] : as above

## 2024-08-03 NOTE — ASSESSMENT
[FreeTextEntry1] : 54 yo woman with [id number] invasive ductal carcinoma of the right breast, ER+/MA+/er2 negative  - premenopausal at time of diagnosis and after lumpectomy and completion of XRT started on tamoxifen in 9/2022; had vaginal bleeding in 6/2023 until 8/2023 but has not had any bleeding since then - will refill tamoxifen for now with plan for 5-7 years of endocrine therapy - encouraged follow-up with Gynecology especially if vaginal bleeding recurs - routine health maintenance labs (lipids, HbA1C, thyroid) with PMD on annual basis - f/u with Dr. Emmanuel annually; to schedule for annual mammo/sono in 6/2025 - encouraged baseline bone density testing given age >50 - Patient had the opportunity to have all their questions answered to their satisfaction - f/u in 6 months

## 2024-08-03 NOTE — PHYSICAL EXAM
[Fully active, able to carry on all pre-disease performance without restriction] : Status 0 - Fully active, able to carry on all pre-disease performance without restriction [Normal] : affect appropriate [de-identified] :  right breast is status post a lumpectomy with well-healed circumareolar scar; there is no nipple retraction, skin dimpling, or palpable masses noted; left breast is without nipple retraction, skin dimpling, or palpable masses.  Bilateral axillae are without adenopathy.

## 2024-08-23 ENCOUNTER — OUTPATIENT (OUTPATIENT)
Dept: OUTPATIENT SERVICES | Facility: HOSPITAL | Age: 54
LOS: 1 days | End: 2024-08-23

## 2024-08-23 ENCOUNTER — APPOINTMENT (OUTPATIENT)
Dept: BARIATRICS/WEIGHT MGMT | Facility: CLINIC | Age: 54
End: 2024-08-23
Payer: COMMERCIAL

## 2024-08-23 VITALS — HEIGHT: 62 IN | BODY MASS INDEX: 27.05 KG/M2 | WEIGHT: 147 LBS

## 2024-08-23 DIAGNOSIS — E66.3 OVERWEIGHT: ICD-10-CM

## 2024-08-23 DIAGNOSIS — I10 ESSENTIAL (PRIMARY) HYPERTENSION: ICD-10-CM

## 2024-08-23 DIAGNOSIS — E78.00 PURE HYPERCHOLESTEROLEMIA, UNSPECIFIED: ICD-10-CM

## 2024-08-23 DIAGNOSIS — Z98.890 OTHER SPECIFIED POSTPROCEDURAL STATES: Chronic | ICD-10-CM

## 2024-08-23 DIAGNOSIS — R12 HEARTBURN: ICD-10-CM

## 2024-08-23 DIAGNOSIS — E66.01 MORBID (SEVERE) OBESITY DUE TO EXCESS CALORIES: ICD-10-CM

## 2024-08-23 PROCEDURE — 99212 OFFICE O/P EST SF 10 MIN: CPT | Mod: 95

## 2024-08-23 PROCEDURE — G2211 COMPLEX E/M VISIT ADD ON: CPT | Mod: 95

## 2024-08-23 NOTE — REASON FOR VISIT
[Other Location: e.g. Home (Enter Location, City,State)___] : at [unfilled] [Patient] : the patient [Other Location: e.g. School (Enter Location, City,State)___] : at [unfilled], at the time of the visit.

## 2024-08-23 NOTE — ASSESSMENT
[FreeTextEntry1] : 52 yo female with history of class 3 obesity and comorbid conditions (gerd, mild dyslipidemia), now overweight, presents for follow up:  - continue Wegovy 2.4 mg  -needs new prior authorization for her medication today. -cw exercise -cw more plant based diet    f/u in 3 months

## 2024-08-23 NOTE — HISTORY OF PRESENT ILLNESS
[FreeTextEntry1] : 52 yo female with history of class 3 obesity, now overweight with comorbid conditions, presents for weight management follow up. Briefly, she started at the weight management clinic 3.2022 for obesity class 3 with mild hyperlipidemia and gerd. She started Ozempic at that time and has remained on Semaglutide since to good effect, weight was 213 lbs and is now 147. Her gerd is improved and her last lipid profile was normal. In addition to the medication she reports following a more whole food plant based diet (though she is not vegan) and she also exercises regularly.   Medical history also significant for breast cancer s/p lumpectomy and RT on tamoxifen since 9/22.    gerd, hiatal hernia, h/o gastritis, constipation, h/o terminal ileitis, thrombocytosis, hemorrhoids, diverticulosis, h/o mild elevated ldl She's been stable in this range which is nice. She'd like to try to use a few more lbs. She is more active than she was before. She's been biking and hiking. Trying to plan for more activity.   Diet:  She's noticed that she tends to be a little hungrier than before, she was on three meals a day prior. She's changed up her diet a little in response to this, small snacks. If she's not hungry then she doesn't eat. She switched to almond milk/almond butter, she shares more meals/eats 1/2 of what she used to eat. She isn't vegan but is trying to eat less meat.   Weight History: Lowest adult weight: 130 Highest adult weight: 213/215  Bariatric surgery history: none Obesity co-morbidities: HLD, vit d def, GERD Comorbidities improved or resolved: gerd is better  Anti-obesity medications: Wegovy Obesity medication side effects: none

## 2024-09-20 ENCOUNTER — TRANSCRIPTION ENCOUNTER (OUTPATIENT)
Age: 54
End: 2024-09-20

## 2024-09-25 ENCOUNTER — APPOINTMENT (OUTPATIENT)
Dept: OPHTHALMOLOGY | Facility: CLINIC | Age: 54
End: 2024-09-25
Payer: COMMERCIAL

## 2024-09-25 ENCOUNTER — NON-APPOINTMENT (OUTPATIENT)
Age: 54
End: 2024-09-25

## 2024-09-25 PROCEDURE — 92004 COMPRE OPH EXAM NEW PT 1/>: CPT

## 2024-11-22 ENCOUNTER — TRANSCRIPTION ENCOUNTER (OUTPATIENT)
Age: 54
End: 2024-11-22

## 2024-11-25 ENCOUNTER — OUTPATIENT (OUTPATIENT)
Dept: OUTPATIENT SERVICES | Facility: HOSPITAL | Age: 54
LOS: 1 days | End: 2024-11-25
Payer: COMMERCIAL

## 2024-11-25 ENCOUNTER — APPOINTMENT (OUTPATIENT)
Dept: BARIATRICS/WEIGHT MGMT | Facility: CLINIC | Age: 54
End: 2024-11-25
Payer: COMMERCIAL

## 2024-11-25 VITALS — HEIGHT: 62 IN | WEIGHT: 145 LBS | BODY MASS INDEX: 26.68 KG/M2

## 2024-11-25 DIAGNOSIS — Z98.890 OTHER SPECIFIED POSTPROCEDURAL STATES: Chronic | ICD-10-CM

## 2024-11-25 DIAGNOSIS — E66.813 OBESITY, CLASS 3: ICD-10-CM

## 2024-11-25 DIAGNOSIS — Z90.49 ACQUIRED ABSENCE OF OTHER SPECIFIED PARTS OF DIGESTIVE TRACT: Chronic | ICD-10-CM

## 2024-11-25 DIAGNOSIS — E78.00 PURE HYPERCHOLESTEROLEMIA, UNSPECIFIED: ICD-10-CM

## 2024-11-25 DIAGNOSIS — I10 ESSENTIAL (PRIMARY) HYPERTENSION: ICD-10-CM

## 2024-11-25 DIAGNOSIS — R12 HEARTBURN: ICD-10-CM

## 2024-11-25 PROCEDURE — G2211 COMPLEX E/M VISIT ADD ON: CPT | Mod: 95

## 2024-11-25 PROCEDURE — 99213 OFFICE O/P EST LOW 20 MIN: CPT | Mod: 95

## 2024-11-25 PROCEDURE — G0463: CPT

## 2024-12-02 ENCOUNTER — TRANSCRIPTION ENCOUNTER (OUTPATIENT)
Age: 54
End: 2024-12-02

## 2024-12-03 DIAGNOSIS — E78.00 PURE HYPERCHOLESTEROLEMIA, UNSPECIFIED: ICD-10-CM

## 2024-12-03 DIAGNOSIS — E66.813 OBESITY, CLASS 3: ICD-10-CM

## 2024-12-03 DIAGNOSIS — R12 HEARTBURN: ICD-10-CM

## 2024-12-16 ENCOUNTER — OUTPATIENT (OUTPATIENT)
Dept: OUTPATIENT SERVICES | Facility: HOSPITAL | Age: 54
LOS: 1 days | End: 2024-12-16
Payer: COMMERCIAL

## 2024-12-16 ENCOUNTER — APPOINTMENT (OUTPATIENT)
Dept: MRI IMAGING | Facility: IMAGING CENTER | Age: 54
End: 2024-12-16
Payer: COMMERCIAL

## 2024-12-16 DIAGNOSIS — Z98.890 OTHER SPECIFIED POSTPROCEDURAL STATES: Chronic | ICD-10-CM

## 2024-12-16 DIAGNOSIS — Z00.8 ENCOUNTER FOR OTHER GENERAL EXAMINATION: ICD-10-CM

## 2024-12-16 DIAGNOSIS — Z90.49 ACQUIRED ABSENCE OF OTHER SPECIFIED PARTS OF DIGESTIVE TRACT: Chronic | ICD-10-CM

## 2024-12-16 PROCEDURE — C8908: CPT

## 2024-12-16 PROCEDURE — 77049 MRI BREAST C-+ W/CAD BI: CPT | Mod: 26

## 2024-12-16 PROCEDURE — A9585: CPT

## 2024-12-16 PROCEDURE — C8937: CPT

## 2024-12-19 ENCOUNTER — RESULT REVIEW (OUTPATIENT)
Age: 54
End: 2024-12-19

## 2024-12-19 ENCOUNTER — APPOINTMENT (OUTPATIENT)
Dept: MRI IMAGING | Facility: IMAGING CENTER | Age: 54
End: 2024-12-19
Payer: COMMERCIAL

## 2024-12-19 ENCOUNTER — OUTPATIENT (OUTPATIENT)
Dept: OUTPATIENT SERVICES | Facility: HOSPITAL | Age: 54
LOS: 1 days | End: 2024-12-19
Payer: COMMERCIAL

## 2024-12-19 DIAGNOSIS — Z90.49 ACQUIRED ABSENCE OF OTHER SPECIFIED PARTS OF DIGESTIVE TRACT: Chronic | ICD-10-CM

## 2024-12-19 DIAGNOSIS — Z98.890 OTHER SPECIFIED POSTPROCEDURAL STATES: Chronic | ICD-10-CM

## 2024-12-19 DIAGNOSIS — Z00.8 ENCOUNTER FOR OTHER GENERAL EXAMINATION: ICD-10-CM

## 2024-12-19 PROCEDURE — A9585: CPT

## 2024-12-19 PROCEDURE — 77065 DX MAMMO INCL CAD UNI: CPT | Mod: 26,RT

## 2024-12-19 PROCEDURE — 88305 TISSUE EXAM BY PATHOLOGIST: CPT

## 2024-12-19 PROCEDURE — A4648: CPT

## 2024-12-19 PROCEDURE — 88305 TISSUE EXAM BY PATHOLOGIST: CPT | Mod: 26

## 2024-12-19 PROCEDURE — 77065 DX MAMMO INCL CAD UNI: CPT

## 2024-12-19 PROCEDURE — 19085 BX BREAST 1ST LESION MR IMAG: CPT | Mod: RT

## 2024-12-19 PROCEDURE — 19085 BX BREAST 1ST LESION MR IMAG: CPT

## 2024-12-23 LAB — SURGICAL PATHOLOGY STUDY: SIGNIFICANT CHANGE UP

## 2025-02-03 ENCOUNTER — OUTPATIENT (OUTPATIENT)
Dept: OUTPATIENT SERVICES | Facility: HOSPITAL | Age: 55
LOS: 1 days | End: 2025-02-03

## 2025-02-03 ENCOUNTER — APPOINTMENT (OUTPATIENT)
Dept: BARIATRICS/WEIGHT MGMT | Facility: CLINIC | Age: 55
End: 2025-02-03
Payer: COMMERCIAL

## 2025-02-03 VITALS — HEIGHT: 62 IN | BODY MASS INDEX: 26.87 KG/M2 | WEIGHT: 146 LBS

## 2025-02-03 DIAGNOSIS — Z90.49 ACQUIRED ABSENCE OF OTHER SPECIFIED PARTS OF DIGESTIVE TRACT: Chronic | ICD-10-CM

## 2025-02-03 DIAGNOSIS — Z98.890 OTHER SPECIFIED POSTPROCEDURAL STATES: Chronic | ICD-10-CM

## 2025-02-03 DIAGNOSIS — R12 HEARTBURN: ICD-10-CM

## 2025-02-03 DIAGNOSIS — E66.813 OBESITY, CLASS 3: ICD-10-CM

## 2025-02-03 DIAGNOSIS — I10 ESSENTIAL (PRIMARY) HYPERTENSION: ICD-10-CM

## 2025-02-03 PROCEDURE — 99214 OFFICE O/P EST MOD 30 MIN: CPT | Mod: 95

## 2025-02-05 ENCOUNTER — OUTPATIENT (OUTPATIENT)
Dept: OUTPATIENT SERVICES | Facility: HOSPITAL | Age: 55
LOS: 1 days | Discharge: HOME | End: 2025-02-05

## 2025-02-05 DIAGNOSIS — Z98.890 OTHER SPECIFIED POSTPROCEDURAL STATES: Chronic | ICD-10-CM

## 2025-02-05 DIAGNOSIS — Z90.49 ACQUIRED ABSENCE OF OTHER SPECIFIED PARTS OF DIGESTIVE TRACT: Chronic | ICD-10-CM

## 2025-02-07 ENCOUNTER — APPOINTMENT (OUTPATIENT)
Dept: HEMATOLOGY ONCOLOGY | Facility: CLINIC | Age: 55
End: 2025-02-07
Payer: COMMERCIAL

## 2025-02-07 ENCOUNTER — NON-APPOINTMENT (OUTPATIENT)
Age: 55
End: 2025-02-07

## 2025-02-07 VITALS
OXYGEN SATURATION: 100 % | RESPIRATION RATE: 16 BRPM | HEIGHT: 61.1 IN | SYSTOLIC BLOOD PRESSURE: 132 MMHG | BODY MASS INDEX: 27.47 KG/M2 | DIASTOLIC BLOOD PRESSURE: 91 MMHG | HEART RATE: 74 BPM | WEIGHT: 145.5 LBS

## 2025-02-07 DIAGNOSIS — Z17.0 MALIGNANT NEOPLASM OF UNSPECIFIED SITE OF RIGHT FEMALE BREAST: ICD-10-CM

## 2025-02-07 DIAGNOSIS — C50.911 MALIGNANT NEOPLASM OF UNSPECIFIED SITE OF RIGHT FEMALE BREAST: ICD-10-CM

## 2025-02-07 PROCEDURE — 99213 OFFICE O/P EST LOW 20 MIN: CPT

## 2025-02-07 PROCEDURE — G2211 COMPLEX E/M VISIT ADD ON: CPT

## 2025-03-24 ENCOUNTER — APPOINTMENT (OUTPATIENT)
Dept: SURGERY | Facility: CLINIC | Age: 55
End: 2025-03-24
Payer: COMMERCIAL

## 2025-03-24 PROCEDURE — 99213K: CUSTOM

## 2025-04-14 ENCOUNTER — APPOINTMENT (OUTPATIENT)
Dept: BARIATRICS/WEIGHT MGMT | Facility: CLINIC | Age: 55
End: 2025-04-14
Payer: COMMERCIAL

## 2025-04-14 ENCOUNTER — OUTPATIENT (OUTPATIENT)
Dept: OUTPATIENT SERVICES | Facility: HOSPITAL | Age: 55
LOS: 1 days | End: 2025-04-14

## 2025-04-14 DIAGNOSIS — Z90.49 ACQUIRED ABSENCE OF OTHER SPECIFIED PARTS OF DIGESTIVE TRACT: Chronic | ICD-10-CM

## 2025-04-14 DIAGNOSIS — E66.813 OBESITY, CLASS 3: ICD-10-CM

## 2025-04-14 DIAGNOSIS — E55.9 VITAMIN D DEFICIENCY, UNSPECIFIED: ICD-10-CM

## 2025-04-14 DIAGNOSIS — Z98.890 OTHER SPECIFIED POSTPROCEDURAL STATES: Chronic | ICD-10-CM

## 2025-04-14 PROCEDURE — 99213 OFFICE O/P EST LOW 20 MIN: CPT | Mod: 95

## 2025-04-15 DIAGNOSIS — I10 ESSENTIAL (PRIMARY) HYPERTENSION: ICD-10-CM

## 2025-06-25 ENCOUNTER — OUTPATIENT (OUTPATIENT)
Dept: OUTPATIENT SERVICES | Facility: HOSPITAL | Age: 55
LOS: 1 days | End: 2025-06-25
Payer: COMMERCIAL

## 2025-06-25 ENCOUNTER — APPOINTMENT (OUTPATIENT)
Dept: MRI IMAGING | Facility: IMAGING CENTER | Age: 55
End: 2025-06-25
Payer: COMMERCIAL

## 2025-06-25 DIAGNOSIS — Z98.890 OTHER SPECIFIED POSTPROCEDURAL STATES: Chronic | ICD-10-CM

## 2025-06-25 DIAGNOSIS — Z00.8 ENCOUNTER FOR OTHER GENERAL EXAMINATION: ICD-10-CM

## 2025-06-25 DIAGNOSIS — Z90.49 ACQUIRED ABSENCE OF OTHER SPECIFIED PARTS OF DIGESTIVE TRACT: Chronic | ICD-10-CM

## 2025-06-25 PROCEDURE — C8908: CPT

## 2025-06-25 PROCEDURE — C8937: CPT

## 2025-06-25 PROCEDURE — 77049 MRI BREAST C-+ W/CAD BI: CPT | Mod: 26

## 2025-06-25 PROCEDURE — A9585: CPT

## 2025-07-07 ENCOUNTER — APPOINTMENT (OUTPATIENT)
Dept: BARIATRICS/WEIGHT MGMT | Facility: CLINIC | Age: 55
End: 2025-07-07
Payer: COMMERCIAL

## 2025-07-07 ENCOUNTER — OUTPATIENT (OUTPATIENT)
Dept: OUTPATIENT SERVICES | Facility: HOSPITAL | Age: 55
LOS: 1 days | End: 2025-07-07

## 2025-07-07 VITALS — BODY MASS INDEX: 27.38 KG/M2 | WEIGHT: 145 LBS | HEIGHT: 61.2 IN

## 2025-07-07 DIAGNOSIS — Z90.49 ACQUIRED ABSENCE OF OTHER SPECIFIED PARTS OF DIGESTIVE TRACT: Chronic | ICD-10-CM

## 2025-07-07 DIAGNOSIS — Z98.890 OTHER SPECIFIED POSTPROCEDURAL STATES: Chronic | ICD-10-CM

## 2025-07-07 PROCEDURE — 99213 OFFICE O/P EST LOW 20 MIN: CPT | Mod: 95

## 2025-07-08 DIAGNOSIS — I10 ESSENTIAL (PRIMARY) HYPERTENSION: ICD-10-CM

## 2025-08-08 ENCOUNTER — APPOINTMENT (OUTPATIENT)
Dept: HEMATOLOGY ONCOLOGY | Facility: CLINIC | Age: 55
End: 2025-08-08
Payer: COMMERCIAL

## 2025-08-08 VITALS
BODY MASS INDEX: 27.54 KG/M2 | SYSTOLIC BLOOD PRESSURE: 132 MMHG | TEMPERATURE: 97.9 F | OXYGEN SATURATION: 100 % | HEART RATE: 76 BPM | WEIGHT: 146.71 LBS | RESPIRATION RATE: 16 BRPM | DIASTOLIC BLOOD PRESSURE: 91 MMHG

## 2025-08-08 DIAGNOSIS — C50.911 MALIGNANT NEOPLASM OF UNSPECIFIED SITE OF RIGHT FEMALE BREAST: ICD-10-CM

## 2025-08-08 DIAGNOSIS — Z17.0 MALIGNANT NEOPLASM OF UNSPECIFIED SITE OF RIGHT FEMALE BREAST: ICD-10-CM

## 2025-08-08 PROCEDURE — G2211 COMPLEX E/M VISIT ADD ON: CPT

## 2025-08-08 PROCEDURE — 99214 OFFICE O/P EST MOD 30 MIN: CPT

## 2025-08-08 RX ORDER — ERGOCALCIFEROL 1.25 MG/1
1.25 MG CAPSULE, LIQUID FILLED ORAL
Qty: 13 | Refills: 0 | Status: ACTIVE | COMMUNITY
Start: 2025-08-08 | End: 1900-01-01

## 2025-09-02 ENCOUNTER — TRANSCRIPTION ENCOUNTER (OUTPATIENT)
Age: 55
End: 2025-09-02

## (undated) DEVICE — ELCTR BOVIE PENCIL SMOKE EVACUATION

## (undated) DEVICE — CONTAINER FORMALIN 80ML YELLOW

## (undated) DEVICE — SUT MONOCRYL 4-0 27" PS-2 UNDYED

## (undated) DEVICE — DRSG 2X2

## (undated) DEVICE — BIOPSY FORCEP COLD DISP

## (undated) DEVICE — LUBRICATING JELLY HR ONE SHOT 3G

## (undated) DEVICE — ELCTR ECG CONDUCTIVE ADHESIVE

## (undated) DEVICE — DRSG CURITY GAUZE SPONGE 4 X 4" 12-PLY NON-STERILE

## (undated) DEVICE — SUT VICRYL 3-0 27" SH UNDYED

## (undated) DEVICE — DRSG COMBINE 5X9"

## (undated) DEVICE — BIOPSY FORCEP RADIAL JAW 4 STANDARD WITH NEEDLE

## (undated) DEVICE — BASIN EMESIS 10IN GRADUATED MAUVE

## (undated) DEVICE — POSITIONER STRAP ARMBOARD VELCRO TS-30

## (undated) DEVICE — ELCTR GROUNDING PAD ADULT COVIDIEN

## (undated) DEVICE — GLV 6 PROTEXIS (WHITE)

## (undated) DEVICE — PACK IV START WITH CHG

## (undated) DEVICE — TUBING IV SET GRAVITY 3Y 100" MACRO

## (undated) DEVICE — PROBE LOCALIZER W/ DRAPES

## (undated) DEVICE — GOWN LG

## (undated) DEVICE — CATH IV SAFE BC 22G X 1" (BLUE)

## (undated) DEVICE — PACK BREAST MINOR

## (undated) DEVICE — SPECIMEN CONTAINER 100ML

## (undated) DEVICE — DRSG BANDAID 0.75X3"

## (undated) DEVICE — LINE BREATHE SAMPLNG

## (undated) DEVICE — TUBING MEDI-VAC W MAXIGRIP CONNECTORS 1/4"X6'

## (undated) DEVICE — TUBING SUCTION NONCONDUCTIVE 6MM X 12FT

## (undated) DEVICE — FACESHIELD FULL VISOR

## (undated) DEVICE — SALIVA EJECTOR (BLUE)